# Patient Record
Sex: FEMALE | Race: BLACK OR AFRICAN AMERICAN | NOT HISPANIC OR LATINO | Employment: FULL TIME | URBAN - METROPOLITAN AREA
[De-identification: names, ages, dates, MRNs, and addresses within clinical notes are randomized per-mention and may not be internally consistent; named-entity substitution may affect disease eponyms.]

---
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
## 2023-10-02 VITALS
DIASTOLIC BLOOD PRESSURE: 104 MMHG | OXYGEN SATURATION: 99 % | RESPIRATION RATE: 24 BRPM | HEART RATE: 92 BPM | TEMPERATURE: 97.6 F | SYSTOLIC BLOOD PRESSURE: 124 MMHG

## 2023-10-02 PROCEDURE — 99285 EMERGENCY DEPT VISIT HI MDM: CPT | Mod: 25

## 2023-10-03 ENCOUNTER — APPOINTMENT (OUTPATIENT)
Dept: CT IMAGING | Facility: CLINIC | Age: 23
End: 2023-10-03
Attending: EMERGENCY MEDICINE
Payer: COMMERCIAL

## 2023-10-03 ENCOUNTER — HOSPITAL ENCOUNTER (EMERGENCY)
Facility: CLINIC | Age: 23
Discharge: HOME | End: 2023-10-03
Attending: EMERGENCY MEDICINE | Admitting: EMERGENCY MEDICINE
Payer: COMMERCIAL

## 2023-10-03 DIAGNOSIS — N10 PYELONEPHRITIS, ACUTE: ICD-10-CM

## 2023-10-03 LAB
ALBUMIN UR-MCNC: 30 MG/DL
ANION GAP SERPL CALCULATED.3IONS-SCNC: 11 MMOL/L (ref 7–15)
APPEARANCE UR: ABNORMAL
BACTERIA #/AREA URNS HPF: ABNORMAL /HPF
BASO+EOS+MONOS # BLD AUTO: NORMAL 10*3/UL
BASO+EOS+MONOS NFR BLD AUTO: NORMAL %
BASOPHILS # BLD AUTO: 0 10E3/UL (ref 0–0.2)
BASOPHILS NFR BLD AUTO: 0 %
BILIRUB UR QL STRIP: NEGATIVE
BUN SERPL-MCNC: 8 MG/DL (ref 6–20)
CALCIUM SERPL-MCNC: 9.2 MG/DL (ref 8.6–10)
CHLORIDE SERPL-SCNC: 100 MMOL/L (ref 98–107)
COLOR UR AUTO: YELLOW
CREAT SERPL-MCNC: 0.62 MG/DL (ref 0.51–0.95)
DEPRECATED HCO3 PLAS-SCNC: 24 MMOL/L (ref 22–29)
EGFRCR SERPLBLD CKD-EPI 2021: >90 ML/MIN/1.73M2
EOSINOPHIL # BLD AUTO: 0.1 10E3/UL (ref 0–0.7)
EOSINOPHIL NFR BLD AUTO: 1 %
ERYTHROCYTE [DISTWIDTH] IN BLOOD BY AUTOMATED COUNT: 13.7 % (ref 10–15)
GLUCOSE SERPL-MCNC: 102 MG/DL (ref 70–99)
GLUCOSE UR STRIP-MCNC: NEGATIVE MG/DL
HCG UR QL: NEGATIVE
HCT VFR BLD AUTO: 41.8 % (ref 35–47)
HGB BLD-MCNC: 14.1 G/DL (ref 11.7–15.7)
HGB UR QL STRIP: ABNORMAL
HOLD SPECIMEN: NORMAL
HOLD SPECIMEN: NORMAL
IMM GRANULOCYTES # BLD: 0 10E3/UL
IMM GRANULOCYTES NFR BLD: 0 %
KETONES UR STRIP-MCNC: NEGATIVE MG/DL
LEUKOCYTE ESTERASE UR QL STRIP: ABNORMAL
LYMPHOCYTES # BLD AUTO: 1.8 10E3/UL (ref 0.8–5.3)
LYMPHOCYTES NFR BLD AUTO: 24 %
MCH RBC QN AUTO: 31.1 PG (ref 26.5–33)
MCHC RBC AUTO-ENTMCNC: 33.7 G/DL (ref 31.5–36.5)
MCV RBC AUTO: 92 FL (ref 78–100)
MONOCYTES # BLD AUTO: 0.6 10E3/UL (ref 0–1.3)
MONOCYTES NFR BLD AUTO: 8 %
MUCOUS THREADS #/AREA URNS LPF: PRESENT /LPF
NEUTROPHILS # BLD AUTO: 4.9 10E3/UL (ref 1.6–8.3)
NEUTROPHILS NFR BLD AUTO: 67 %
NITRATE UR QL: NEGATIVE
NRBC # BLD AUTO: 0 10E3/UL
NRBC BLD AUTO-RTO: 0 /100
PH UR STRIP: 6 [PH] (ref 5–7)
PLATELET # BLD AUTO: 312 10E3/UL (ref 150–450)
POTASSIUM SERPL-SCNC: 4.4 MMOL/L (ref 3.4–5.3)
RBC # BLD AUTO: 4.53 10E6/UL (ref 3.8–5.2)
RBC URINE: 12 /HPF
SODIUM SERPL-SCNC: 135 MMOL/L (ref 135–145)
SP GR UR STRIP: 1.01 (ref 1–1.03)
SQUAMOUS EPITHELIAL: 1 /HPF
UROBILINOGEN UR STRIP-MCNC: 2 MG/DL
WBC # BLD AUTO: 7.4 10E3/UL (ref 4–11)
WBC CLUMPS #/AREA URNS HPF: PRESENT /HPF
WBC URINE: >182 /HPF

## 2023-10-03 PROCEDURE — 87186 SC STD MICRODIL/AGAR DIL: CPT | Performed by: EMERGENCY MEDICINE

## 2023-10-03 PROCEDURE — 81025 URINE PREGNANCY TEST: CPT | Performed by: EMERGENCY MEDICINE

## 2023-10-03 PROCEDURE — 258N000003 HC RX IP 258 OP 636: Performed by: EMERGENCY MEDICINE

## 2023-10-03 PROCEDURE — 81001 URINALYSIS AUTO W/SCOPE: CPT | Performed by: EMERGENCY MEDICINE

## 2023-10-03 PROCEDURE — 85025 COMPLETE CBC W/AUTO DIFF WBC: CPT | Performed by: EMERGENCY MEDICINE

## 2023-10-03 PROCEDURE — 250N000011 HC RX IP 250 OP 636: Performed by: EMERGENCY MEDICINE

## 2023-10-03 PROCEDURE — 80048 BASIC METABOLIC PNL TOTAL CA: CPT | Performed by: EMERGENCY MEDICINE

## 2023-10-03 PROCEDURE — 250N000011 HC RX IP 250 OP 636: Mod: JZ | Performed by: EMERGENCY MEDICINE

## 2023-10-03 PROCEDURE — 96365 THER/PROPH/DIAG IV INF INIT: CPT

## 2023-10-03 PROCEDURE — 250N000013 HC RX MED GY IP 250 OP 250 PS 637: Performed by: EMERGENCY MEDICINE

## 2023-10-03 PROCEDURE — 36415 COLL VENOUS BLD VENIPUNCTURE: CPT | Performed by: EMERGENCY MEDICINE

## 2023-10-03 PROCEDURE — 96375 TX/PRO/DX INJ NEW DRUG ADDON: CPT

## 2023-10-03 PROCEDURE — 74176 CT ABD & PELVIS W/O CONTRAST: CPT

## 2023-10-03 RX ORDER — CEFDINIR 300 MG/1
300 CAPSULE ORAL 2 TIMES DAILY
Qty: 14 CAPSULE | Refills: 0 | Status: SHIPPED | OUTPATIENT
Start: 2023-10-03 | End: 2023-10-10

## 2023-10-03 RX ORDER — KETOROLAC TROMETHAMINE 15 MG/ML
15 INJECTION, SOLUTION INTRAMUSCULAR; INTRAVENOUS ONCE
Status: COMPLETED | OUTPATIENT
Start: 2023-10-03 | End: 2023-10-03

## 2023-10-03 RX ORDER — ONDANSETRON 4 MG/1
4 TABLET, ORALLY DISINTEGRATING ORAL ONCE
Status: COMPLETED | OUTPATIENT
Start: 2023-10-03 | End: 2023-10-03

## 2023-10-03 RX ORDER — ACETAMINOPHEN 325 MG/1
975 TABLET ORAL ONCE
Status: COMPLETED | OUTPATIENT
Start: 2023-10-03 | End: 2023-10-03

## 2023-10-03 RX ORDER — CEFTRIAXONE 2 G/1
2 INJECTION, POWDER, FOR SOLUTION INTRAMUSCULAR; INTRAVENOUS ONCE
Status: COMPLETED | OUTPATIENT
Start: 2023-10-03 | End: 2023-10-03

## 2023-10-03 RX ORDER — OXYCODONE HYDROCHLORIDE 5 MG/1
5 TABLET ORAL EVERY 6 HOURS PRN
Qty: 6 TABLET | Refills: 0 | Status: SHIPPED | OUTPATIENT
Start: 2023-10-03 | End: 2023-10-06

## 2023-10-03 RX ORDER — ONDANSETRON 4 MG/1
4 TABLET, ORALLY DISINTEGRATING ORAL EVERY 8 HOURS PRN
Qty: 10 TABLET | Refills: 0 | Status: SHIPPED | OUTPATIENT
Start: 2023-10-03 | End: 2023-10-06

## 2023-10-03 RX ADMIN — KETOROLAC TROMETHAMINE 15 MG: 15 INJECTION INTRAMUSCULAR; INTRAVENOUS at 03:28

## 2023-10-03 RX ADMIN — SODIUM CHLORIDE 1000 ML: 9 INJECTION, SOLUTION INTRAVENOUS at 03:28

## 2023-10-03 RX ADMIN — ONDANSETRON 4 MG: 4 TABLET, ORALLY DISINTEGRATING ORAL at 00:04

## 2023-10-03 RX ADMIN — CEFTRIAXONE 2 G: 2 INJECTION, POWDER, FOR SOLUTION INTRAMUSCULAR; INTRAVENOUS at 03:28

## 2023-10-03 RX ADMIN — ACETAMINOPHEN 975 MG: 325 TABLET, FILM COATED ORAL at 00:04

## 2023-10-03 ASSESSMENT — ACTIVITIES OF DAILY LIVING (ADL)
ADLS_ACUITY_SCORE: 33
ADLS_ACUITY_SCORE: 33

## 2023-10-03 NOTE — ED TRIAGE NOTES
Pt arrives with right sided flank pain since 1500. Pt thinks she has a UTI due to blood in her urine and painful urination. VSS.      Triage Assessment       Row Name 10/02/23 3580       Triage Assessment (Adult)    Airway WDL WDL       Respiratory WDL    Respiratory WDL WDL       Skin Circulation/Temperature WDL    Skin Circulation/Temperature WDL WDL       Cardiac WDL    Cardiac WDL WDL       Peripheral/Neurovascular WDL    Peripheral Neurovascular WDL WDL       Cognitive/Neuro/Behavioral WDL    Cognitive/Neuro/Behavioral WDL WDL

## 2023-10-03 NOTE — ED PROVIDER NOTES
[facility] ED Provider Note  [facility] Emergency Department  7:22 AM  10/3/2023    Colleen Espino  23 year oldfemale    Chief Complaint   Patient presents with    Flank Pain       HPI:    23-year-old female otherwise healthy presenting with right-sided flank pain and lower abdominal pain associated with hematuria and dysuria.  No fevers.  No associated nausea or vomiting.          Independent Historian:     None     Review of External Notes:     None           ROS: ROS is negative other than mentioned above in HPI      Pertinent PMH/PSH:     None         Current Outpatient Medications   Medication Instructions    albuterol (VENTOLIN HFA) 108 (90 BASE) MCG/ACT Inhaler 2 puffs, Inhalation, EVERY 4 HOURS PRN    cefdinir (OMNICEF) 300 mg, Oral, 2 TIMES DAILY    ondansetron (ZOFRAN ODT) 4 mg, Oral, EVERY 8 HOURS PRN    oxyCODONE (ROXICODONE) 5 mg, Oral, EVERY 6 HOURS PRN           No Known Allergies      Physical Exam  BP (!) 124/104   Pulse 92   Temp 97.6  F (36.4  C) (Temporal)   Resp 24   LMP 09/18/2023 (Exact Date)   SpO2 99%       No significant localizing abdominal tenderness palpation    CV: ppi, regular   Resp: speaking in full sentences without any resp distress  Skin: warm dry well perfused  Neuro: Alert, no gross motor or sensory deficits,  gait stable        Labs and Imaging:    Labs Ordered and Resulted from Time of ED Arrival to Time of ED Departure   ROUTINE UA WITH MICROSCOPIC REFLEX TO CULTURE - Abnormal       Result Value    Color Urine Yellow      Appearance Urine Slightly Cloudy (*)     Glucose Urine Negative      Bilirubin Urine Negative      Ketones Urine Negative      Specific Gravity Urine 1.010      Blood Urine Moderate (*)     pH Urine 6.0      Protein Albumin Urine 30 (*)     Urobilinogen Urine 2.0      Nitrite Urine Negative      Leukocyte Esterase Urine Large (*)     Bacteria Urine Few (*)     WBC Clumps Urine Present (*)     Mucus Urine Present (*)     RBC Urine 12 (*)     WBC Urine >182  (*)     Squamous Epithelials Urine 1     BASIC METABOLIC PANEL - Abnormal    Sodium 135      Potassium 4.4      Chloride 100      Carbon Dioxide (CO2) 24      Anion Gap 11      Urea Nitrogen 8.0      Creatinine 0.62      GFR Estimate >90      Calcium 9.2      Glucose 102 (*)    HCG QUALITATIVE URINE - Normal    hCG Urine Qualitative Negative     CBC WITH PLATELETS AND DIFFERENTIAL    WBC Count 7.4      RBC Count 4.53      Hemoglobin 14.1      Hematocrit 41.8      MCV 92      MCH 31.1      MCHC 33.7      RDW 13.7      Platelet Count 312      % Neutrophils 67      % Lymphocytes 24      % Monocytes 8      Mids % (Monos, Eos, Basos)        % Eosinophils 1      % Basophils 0      % Immature Granulocytes 0      NRBCs per 100 WBC 0      Absolute Neutrophils 4.9      Absolute Lymphocytes 1.8      Absolute Monocytes 0.6      Mids Abs (Monos, Eos, Basos)        Absolute Eosinophils 0.1      Absolute Basophils 0.0      Absolute Immature Granulocytes 0.0      Absolute NRBCs 0.0     URINE CULTURE         Abd/pelvis CT no contrast - Stone Protocol   Final Result   IMPRESSION:       1.  Mild wall thickening of the urinary bladder may indicate cystitis. Associated right pyelonephritis is difficult to assess on basis of this noncontrast enhanced study.                 Medications Administered in ED:  Medications   ondansetron (ZOFRAN ODT) ODT tab 4 mg (4 mg Oral $Given 10/3/23 0004)   acetaminophen (TYLENOL) tablet 975 mg (975 mg Oral $Given 10/3/23 0004)   cefTRIAXone (ROCEPHIN) 2 g vial to attach to  ml bag for ADULTS or NS 50 ml bag for PEDS (0 g Intravenous Stopped 10/3/23 0501)   ketorolac (TORADOL) injection 15 mg (15 mg Intravenous $Given 10/3/23 0328)   sodium chloride 0.9% BOLUS 1,000 mL (0 mLs Intravenous Stopped 10/3/23 0501)             Independent Interpretation (X-rays, CTs, rhythm strip):    None      Consultations/Discussion of Management or Tests:    None     Social Determinants of Health affecting  care:    None           Medical Decision Makin-year-old female here with dysuria flank pain found to have cystitis/pyelonephritis but no concomitant obstruction on CT.  Symptoms improved with time and medicines here in the emergency department.  Started on ceftriaxone and transition to oral Omnicef.      Diagnosis:    ICD-10-CM    1. Pyelonephritis, acute  N10           Disposition:  home      Trip Lopez MD  [facility]  [facility]       Trip Lopez MD  10/03/23 5683

## 2023-10-04 LAB — BACTERIA UR CULT: ABNORMAL

## 2023-12-12 ENCOUNTER — HOSPITAL ENCOUNTER (EMERGENCY)
Facility: CLINIC | Age: 23
Discharge: HOME | End: 2023-12-12
Attending: EMERGENCY MEDICINE | Admitting: EMERGENCY MEDICINE
Payer: COMMERCIAL

## 2023-12-12 VITALS
DIASTOLIC BLOOD PRESSURE: 57 MMHG | OXYGEN SATURATION: 100 % | RESPIRATION RATE: 18 BRPM | WEIGHT: 130 LBS | TEMPERATURE: 97.5 F | SYSTOLIC BLOOD PRESSURE: 118 MMHG | HEART RATE: 88 BPM

## 2023-12-12 DIAGNOSIS — N30.00 ACUTE CYSTITIS WITHOUT HEMATURIA: ICD-10-CM

## 2023-12-12 LAB
ALBUMIN UR-MCNC: 70 MG/DL
APPEARANCE UR: ABNORMAL
BACTERIA #/AREA URNS HPF: ABNORMAL /HPF
BILIRUB UR QL STRIP: NEGATIVE
COLOR UR AUTO: ABNORMAL
GLUCOSE UR STRIP-MCNC: NEGATIVE MG/DL
HCG UR QL: NEGATIVE
HGB UR QL STRIP: ABNORMAL
KETONES UR STRIP-MCNC: 10 MG/DL
LEUKOCYTE ESTERASE UR QL STRIP: ABNORMAL
MUCOUS THREADS #/AREA URNS LPF: PRESENT /LPF
NITRATE UR QL: NEGATIVE
PH UR STRIP: 6 [PH] (ref 5–7)
RBC URINE: 7 /HPF
SP GR UR STRIP: 1 (ref 1–1.03)
SQUAMOUS EPITHELIAL: 1 /HPF
UROBILINOGEN UR STRIP-MCNC: NORMAL MG/DL
WBC CLUMPS #/AREA URNS HPF: PRESENT /HPF
WBC URINE: 163 /HPF

## 2023-12-12 PROCEDURE — 250N000013 HC RX MED GY IP 250 OP 250 PS 637: Performed by: EMERGENCY MEDICINE

## 2023-12-12 PROCEDURE — 81001 URINALYSIS AUTO W/SCOPE: CPT | Performed by: EMERGENCY MEDICINE

## 2023-12-12 PROCEDURE — 99284 EMERGENCY DEPT VISIT MOD MDM: CPT

## 2023-12-12 PROCEDURE — 81025 URINE PREGNANCY TEST: CPT | Performed by: EMERGENCY MEDICINE

## 2023-12-12 PROCEDURE — 87086 URINE CULTURE/COLONY COUNT: CPT | Performed by: EMERGENCY MEDICINE

## 2023-12-12 RX ORDER — PHENAZOPYRIDINE HYDROCHLORIDE 200 MG/1
200 TABLET, FILM COATED ORAL 3 TIMES DAILY PRN
Qty: 9 TABLET | Refills: 0 | Status: SHIPPED | OUTPATIENT
Start: 2023-12-12 | End: 2023-12-15

## 2023-12-12 RX ORDER — CEPHALEXIN 500 MG/1
500 CAPSULE ORAL ONCE
Status: COMPLETED | OUTPATIENT
Start: 2023-12-12 | End: 2023-12-12

## 2023-12-12 RX ORDER — CEPHALEXIN 500 MG/1
500 CAPSULE ORAL 3 TIMES DAILY
Qty: 21 CAPSULE | Refills: 0 | Status: SHIPPED | OUTPATIENT
Start: 2023-12-12 | End: 2023-12-19

## 2023-12-12 RX ORDER — PHENAZOPYRIDINE HYDROCHLORIDE 100 MG/1
200 TABLET, FILM COATED ORAL ONCE
Status: COMPLETED | OUTPATIENT
Start: 2023-12-12 | End: 2023-12-12

## 2023-12-12 RX ADMIN — CEPHALEXIN 500 MG: 500 CAPSULE ORAL at 21:40

## 2023-12-12 RX ADMIN — PHENAZOPYRIDINE 200 MG: 100 TABLET ORAL at 21:39

## 2023-12-13 NOTE — ED TRIAGE NOTES
PT reports that she started having urinary frequency over the last 24 hours. PT reports that she had Pyelonephritis in October and reports that this feels similar. PT also reports dysuria. PT VSS and ABC's intact

## 2023-12-13 NOTE — ED PROVIDER NOTES
History     Chief Complaint:  Urinary Frequency     The history is provided by the patient.      Colleen Espino is a 23 year old female with a history of pyelonephritis who presents to the emergency department for urinary frequency. The patient states that yesterday with symptoms worsening today, she has been experiencing dysuria and urinary frequency. She notes that she is also experiencing pain in her lower abdominal pain and lower back. Denies fever or vomiting. Denies any vaginal bleeding or vaginal discharge. Denies any changes to her bowel movements. Denies any past pertinent medical history. Denies taking daily medications. She reports that she was seen in October for acute pyelonephritis.    Independent Historian:   None - Patient Only    Review of External Notes:   None     Medications:    Albuterol    Past Medical History:    HIV positive  Pyelonephritis    Physical Exam   Patient Vitals for the past 24 hrs:   BP Temp Temp src Pulse Resp SpO2 Weight   12/12/23 1833 121/73 97.5  F (36.4  C) Temporal 105 18 99 % 59 kg (130 lb)      Physical Exam      HEENT:    Oropharynx is moist  Eyes:    Conjunctiva normal  Neck:     Supple, no meningismus.     CV:     Regular rate and rhythm.      No murmurs, rubs or gallops.  PULM:    Clear to auscultation bilateral.       No respiratory distress.      Good air exchange.  ABD:    Soft, non-distended.       Mild focal tenderness in the midline low abdomen.     No pulsatile masses.       No rebound, guarding or rigidity.     No CVA tenderness.   MSK:     No gross deformity to all four extremities.   LYMPH:   No cervical lymphadenopathy.  NEURO:   Alert.  Good muscular tone, no atrophy.   Skin:    Warm, dry and intact.    Psych:    Mood is good and affect is appropriate.      Emergency Department Course     Laboratory:  Labs Ordered and Resulted from Time of ED Arrival to Time of ED Departure   ROUTINE UA WITH MICROSCOPIC REFLEX TO CULTURE - Abnormal       Result Value     Color Urine Light Yellow      Appearance Urine Slightly Cloudy (*)     Glucose Urine Negative      Bilirubin Urine Negative      Ketones Urine 10 (*)     Specific Gravity Urine 1.005      Blood Urine Moderate (*)     pH Urine 6.0      Protein Albumin Urine 70 (*)     Urobilinogen Urine Normal      Nitrite Urine Negative      Leukocyte Esterase Urine Large (*)     Bacteria Urine Many (*)     WBC Clumps Urine Present (*)     Mucus Urine Present (*)     RBC Urine 7 (*)     WBC Urine 163 (*)     Squamous Epithelials Urine 1     HCG QUALITATIVE URINE - Normal    hCG Urine Qualitative Negative     URINE CULTURE      Emergency Department Course & Assessments:    Interventions:  Medications   cephALEXin (KEFLEX) capsule 500 mg (has no administration in time range)   phenazopyridine (PYRIDIUM) tablet 200 mg (has no administration in time range)      Assessments:   I obtained history and examined the patient as noted above. I discussed findings and   discharge with the patient. All questions answered.     Independent Interpretation (X-rays, CTs, rhythm strip):  None    Consultations/Discussion of Management or Tests:  None     Social Determinants of Health affecting care:   None    Disposition:  The patient was discharged to home.     Impression & Plan      Medical Decision Makin-year-old female presents with urinary frequency and urgency.  Evaluation is consistent with acute cystitis.  She has no features to suggest pyelonephritis.  Previous urine culture revealed E. coli that was pansensitive.  Patient given first dose of cephalexin in the ED and will be discharged home with cephalexin 3 times daily for 7 days.  Pyridium as needed for pain.  Return to ED for worsening symptoms.    Diagnosis:    ICD-10-CM    1. Acute cystitis without hematuria  N30.00          Discharge Medications:  New Prescriptions    CEPHALEXIN (KEFLEX) 500 MG CAPSULE    Take 1 capsule (500 mg) by mouth 3 times daily for 7 days     PHENAZOPYRIDINE (PYRIDIUM) 200 MG TABLET    Take 1 tablet (200 mg) by mouth 3 times daily as needed for pain (painful urination)      Scribe Disclosure:  I, David Evans, am serving as a scribe at 9:33 PM on 12/12/2023 to document services personally performed by Edi Siegel MD based on my observations and the provider's statements to me.     12/12/2023   Edi Siegel MD Matthews, Jeremiah R, MD  12/13/23 0123

## 2023-12-14 ENCOUNTER — TELEPHONE (OUTPATIENT)
Dept: EMERGENCY MEDICINE | Facility: CLINIC | Age: 23
End: 2023-12-14
Payer: COMMERCIAL

## 2023-12-14 LAB — BACTERIA UR CULT: ABNORMAL

## 2023-12-14 NOTE — TELEPHONE ENCOUNTER
[facility] Emergency Department/Urgent Care Lab result notification  [Note:  ED Lab Results RN will reference the [facility] Emergency Dept visit note prior to contacting patient AND/OR prior to consulting Emergency Dept Provider.  Highlights of Emergency Dept visit in information summary at the bottom of this telephone note]    1. Reason for call  Notify of lab results  Assess patient symptoms [if necessary]  Review ED Providers recommendations/discharge instructions (if necessary)  Advise per [facility] ED lab result protocol    2. Lab Result (including Rx patient on, if applicable).  If culture, copy of lab report at bottom.  Final Urine Culture Report on 12/14/23  [facility] Emergency Dept discharge antibiotic prescribed: Cephalexin (Keflex) 500 mg capsule, 1 capsule (500 mg) by mouth 3 times daily for 7 days.  #1. Bacteria, 50,000 - 100,000 CFU/ML Escherichia coli ESBL,  is [RESISTANT] to antibiotic.  Recommendations in treatment per [facility] lab result Urine Culture protocol.    3. RN Assessment (Patient's current Symptoms):  Time of call: 1520  Assessment: Left voicemail message requesting a call back to [facility] ED Lab Result RN at [phone].  RN is available every day between 9 a.m. and 5:30 p.m.  Letter Sent    4. RN Recommendations/Instructions per [facility] lab result protocol  [facility] ED lab result protocol used: Urine Cx  Colleen was not notified of lab result and treatment recommendations  Urine Cx shows resistance to ABX, should be switched per protocol on callback    Information summary from Emergency Dept/Urgent Care visit on 12/12/23  Symptoms reported at ED/UC visit (Chief complaint, HPI) Colleen Espino is a 23 year old female with a history of pyelonephritis who presents to the emergency department for urinary frequency. The patient states that yesterday with symptoms worsening today, she has been experiencing dysuria and urinary  frequency. She notes that she is also experiencing pain in her lower abdominal pain and lower back. Denies fever or vomiting. Denies any vaginal bleeding or vaginal discharge. Denies any changes to her bowel movements. Denies any past pertinent medical history. Denies taking daily medications. She reports that she was seen in October for acute pyelonephritis.    Significant Medical hx, if applicable (i.e. CKD, diabetes) Reviewed   Allergies No Known Allergies   Weight, if applicable Wt Readings from Last 2 Encounters:   12/12/23 59 kg (130 lb)      Coumadin/Warfarin [Yes /No] No   Creatinine Level (mg/dl) Creatinine   Date Value Ref Range Status   10/03/2023 0.62 0.51 - 0.95 mg/dL Final      Creatinine clearance (ml/min), if applicable Based off last recorded weight, estimated CrCL of 132.976     Pregnant (Yes/No/NA) Hcg Neg 12/12/23   Breastfeeding (Yes/No/NA) No   ED/UC Provider Impression and Plan (applicable information) 23-year-old female presents with urinary frequency and urgency.  Evaluation is consistent with acute cystitis.  She has no features to suggest pyelonephritis.  Previous urine culture revealed E. coli that was pansensitive.  Patient given first dose of cephalexin in the ED and will be discharged home with cephalexin 3 times daily for 7 days.  Pyridium as needed for pain.  Return to ED for worsening symptoms.    ED/UC diagnosis Acute cystitis without hematuria   ED/UC Provider Edi Siegel MD        Copy of Lab report (if applicable)        Benoit Martinez RN  [facility]  Emergency Dept Lab Result RN  Ph# [phone]

## 2023-12-14 NOTE — LETTER
December 14, 2023        Colleen Espino  [address] [place] [address]          Dear Colleen Espino:    You were seen in the [facility] Emergency Department at [facility] EMERGENCY DEPT on 12/12/2023.  We are unable to reach you by phone, so we are sending you this letter.     It is important that you call [facility] Emergency Department lab result nurse at [phone], as we have information to relay to you AND/OR we MAY have to make some changes in your treatment.    Best time to call back is between 9AM and 5:30PM, 7 days a week.      Sincerely,     [facility] Emergency Department Lab Result RN  [phone]

## 2024-06-24 ENCOUNTER — APPOINTMENT (OUTPATIENT)
Dept: ULTRASOUND IMAGING | Facility: CLINIC | Age: 24
End: 2024-06-24

## 2024-06-24 ENCOUNTER — ANESTHESIA (OUTPATIENT)
Dept: SURGERY | Facility: CLINIC | Age: 24
End: 2024-06-24

## 2024-06-24 ENCOUNTER — HOSPITAL ENCOUNTER (OUTPATIENT)
Facility: CLINIC | Age: 24
Discharge: HOME | End: 2024-06-25
Admitting: STUDENT IN AN ORGANIZED HEALTH CARE EDUCATION/TRAINING PROGRAM

## 2024-06-24 ENCOUNTER — ANESTHESIA EVENT (OUTPATIENT)
Dept: SURGERY | Facility: CLINIC | Age: 24
End: 2024-06-24

## 2024-06-24 DIAGNOSIS — Z75.8 DOES NOT HAVE PRIMARY CARE PROVIDER: ICD-10-CM

## 2024-06-24 DIAGNOSIS — F41.9 ANXIETY: ICD-10-CM

## 2024-06-24 DIAGNOSIS — O02.1 MISSED ABORTION WITH FETAL DEMISE BEFORE 20 COMPLETED WEEKS OF GESTATION: ICD-10-CM

## 2024-06-24 DIAGNOSIS — Z21 HIV INFECTION, UNSPECIFIED SYMPTOM STATUS (H): Primary | ICD-10-CM

## 2024-06-24 LAB
ABO/RH(D): NORMAL
ANION GAP SERPL CALCULATED.3IONS-SCNC: 14 MMOL/L (ref 7–15)
ANTIBODY SCREEN: NEGATIVE
APTT PPP: 26 SECONDS (ref 22–38)
BASOPHILS # BLD AUTO: 0 10E3/UL (ref 0–0.2)
BASOPHILS NFR BLD AUTO: 0 %
BUN SERPL-MCNC: 4.8 MG/DL (ref 6–20)
CALCIUM SERPL-MCNC: 9.1 MG/DL (ref 8.6–10)
CHLORIDE SERPL-SCNC: 102 MMOL/L (ref 98–107)
CREAT SERPL-MCNC: 0.51 MG/DL (ref 0.51–0.95)
DEPRECATED HCO3 PLAS-SCNC: 21 MMOL/L (ref 22–29)
EGFRCR SERPLBLD CKD-EPI 2021: >90 ML/MIN/1.73M2
EOSINOPHIL # BLD AUTO: 0.2 10E3/UL (ref 0–0.7)
EOSINOPHIL NFR BLD AUTO: 2 %
ERYTHROCYTE [DISTWIDTH] IN BLOOD BY AUTOMATED COUNT: 14 % (ref 10–15)
FIBRINOGEN PPP-MCNC: 394 MG/DL (ref 170–490)
FIBRINOGEN PPP-MCNC: NORMAL MG/DL
GLUCOSE SERPL-MCNC: 96 MG/DL (ref 70–99)
HCG INTACT+B SERPL-ACNC: 3700 MIU/ML
HCT VFR BLD AUTO: 37.8 % (ref 35–47)
HGB BLD-MCNC: 13.1 G/DL (ref 11.7–15.7)
IMM GRANULOCYTES # BLD: 0 10E3/UL
IMM GRANULOCYTES NFR BLD: 0 %
INR PPP: 1.13 (ref 0.85–1.15)
INR PPP: NORMAL
LYMPHOCYTES # BLD AUTO: 1.1 10E3/UL (ref 0.8–5.3)
LYMPHOCYTES NFR BLD AUTO: 12 %
MCH RBC QN AUTO: 34.8 PG (ref 26.5–33)
MCHC RBC AUTO-ENTMCNC: 34.7 G/DL (ref 31.5–36.5)
MCV RBC AUTO: 101 FL (ref 78–100)
MONOCYTES # BLD AUTO: 0.7 10E3/UL (ref 0–1.3)
MONOCYTES NFR BLD AUTO: 8 %
NEUTROPHILS # BLD AUTO: 6.9 10E3/UL (ref 1.6–8.3)
NEUTROPHILS NFR BLD AUTO: 78 %
NRBC # BLD AUTO: 0 10E3/UL
NRBC BLD AUTO-RTO: 0 /100
PLATELET # BLD AUTO: 333 10E3/UL (ref 150–450)
POTASSIUM SERPL-SCNC: 4.7 MMOL/L (ref 3.4–5.3)
RBC # BLD AUTO: 3.76 10E6/UL (ref 3.8–5.2)
SODIUM SERPL-SCNC: 137 MMOL/L (ref 135–145)
SPECIMEN EXPIRATION DATE: NORMAL
WBC # BLD AUTO: 8.9 10E3/UL (ref 4–11)

## 2024-06-24 PROCEDURE — 99244 OFF/OP CNSLTJ NEW/EST MOD 40: CPT | Mod: 57 | Performed by: STUDENT IN AN ORGANIZED HEALTH CARE EDUCATION/TRAINING PROGRAM

## 2024-06-24 PROCEDURE — 250N000011 HC RX IP 250 OP 636: Mod: JZ | Performed by: NURSE ANESTHETIST, CERTIFIED REGISTERED

## 2024-06-24 PROCEDURE — 250N000009 HC RX 250: Performed by: STUDENT IN AN ORGANIZED HEALTH CARE EDUCATION/TRAINING PROGRAM

## 2024-06-24 PROCEDURE — 360N000076 HC SURGERY LEVEL 3, PER MIN: Performed by: STUDENT IN AN ORGANIZED HEALTH CARE EDUCATION/TRAINING PROGRAM

## 2024-06-24 PROCEDURE — 59812 TREATMENT OF MISCARRIAGE: CPT | Performed by: STUDENT IN AN ORGANIZED HEALTH CARE EDUCATION/TRAINING PROGRAM

## 2024-06-24 PROCEDURE — 76801 OB US < 14 WKS SINGLE FETUS: CPT

## 2024-06-24 PROCEDURE — 710N000009 HC RECOVERY PHASE 1, LEVEL 1, PER MIN: Performed by: STUDENT IN AN ORGANIZED HEALTH CARE EDUCATION/TRAINING PROGRAM

## 2024-06-24 PROCEDURE — 999N000063 US INTRAOPERATIVE: Mod: TC

## 2024-06-24 PROCEDURE — 258N000003 HC RX IP 258 OP 636: Mod: JZ | Performed by: NURSE ANESTHETIST, CERTIFIED REGISTERED

## 2024-06-24 PROCEDURE — 96361 HYDRATE IV INFUSION ADD-ON: CPT

## 2024-06-24 PROCEDURE — 85384 FIBRINOGEN ACTIVITY: CPT | Performed by: EMERGENCY MEDICINE

## 2024-06-24 PROCEDURE — 85384 FIBRINOGEN ACTIVITY: CPT

## 2024-06-24 PROCEDURE — 85610 PROTHROMBIN TIME: CPT | Performed by: EMERGENCY MEDICINE

## 2024-06-24 PROCEDURE — 87491 CHLMYD TRACH DNA AMP PROBE: CPT | Performed by: STUDENT IN AN ORGANIZED HEALTH CARE EDUCATION/TRAINING PROGRAM

## 2024-06-24 PROCEDURE — 96374 THER/PROPH/DIAG INJ IV PUSH: CPT

## 2024-06-24 PROCEDURE — 250N000025 HC SEVOFLURANE, PER MIN: Performed by: STUDENT IN AN ORGANIZED HEALTH CARE EDUCATION/TRAINING PROGRAM

## 2024-06-24 PROCEDURE — 250N000009 HC RX 250: Performed by: NURSE ANESTHETIST, CERTIFIED REGISTERED

## 2024-06-24 PROCEDURE — 278N000051 HC OR IMPLANT GENERAL: Performed by: STUDENT IN AN ORGANIZED HEALTH CARE EDUCATION/TRAINING PROGRAM

## 2024-06-24 PROCEDURE — 80048 BASIC METABOLIC PNL TOTAL CA: CPT

## 2024-06-24 PROCEDURE — 36415 COLL VENOUS BLD VENIPUNCTURE: CPT

## 2024-06-24 PROCEDURE — 272N000001 HC OR GENERAL SUPPLY STERILE: Performed by: STUDENT IN AN ORGANIZED HEALTH CARE EDUCATION/TRAINING PROGRAM

## 2024-06-24 PROCEDURE — 85025 COMPLETE CBC W/AUTO DIFF WBC: CPT

## 2024-06-24 PROCEDURE — 710N000012 HC RECOVERY PHASE 2, PER MINUTE: Performed by: STUDENT IN AN ORGANIZED HEALTH CARE EDUCATION/TRAINING PROGRAM

## 2024-06-24 PROCEDURE — 88305 TISSUE EXAM BY PATHOLOGIST: CPT | Mod: 26 | Performed by: PATHOLOGY

## 2024-06-24 PROCEDURE — 250N000011 HC RX IP 250 OP 636: Mod: JZ | Performed by: STUDENT IN AN ORGANIZED HEALTH CARE EDUCATION/TRAINING PROGRAM

## 2024-06-24 PROCEDURE — 250N000011 HC RX IP 250 OP 636: Mod: JZ

## 2024-06-24 PROCEDURE — 88233 TISSUE CULTURE SKIN/BIOPSY: CPT | Performed by: STUDENT IN AN ORGANIZED HEALTH CARE EDUCATION/TRAINING PROGRAM

## 2024-06-24 PROCEDURE — 999N000141 HC STATISTIC PRE-PROCEDURE NURSING ASSESSMENT: Performed by: STUDENT IN AN ORGANIZED HEALTH CARE EDUCATION/TRAINING PROGRAM

## 2024-06-24 PROCEDURE — G0145 SCR C/V CYTO,THINLAYER,RESCR: HCPCS | Performed by: STUDENT IN AN ORGANIZED HEALTH CARE EDUCATION/TRAINING PROGRAM

## 2024-06-24 PROCEDURE — 250N000013 HC RX MED GY IP 250 OP 250 PS 637: Performed by: STUDENT IN AN ORGANIZED HEALTH CARE EDUCATION/TRAINING PROGRAM

## 2024-06-24 PROCEDURE — 84702 CHORIONIC GONADOTROPIN TEST: CPT

## 2024-06-24 PROCEDURE — 85610 PROTHROMBIN TIME: CPT

## 2024-06-24 PROCEDURE — 370N000017 HC ANESTHESIA TECHNICAL FEE, PER MIN: Performed by: STUDENT IN AN ORGANIZED HEALTH CARE EDUCATION/TRAINING PROGRAM

## 2024-06-24 PROCEDURE — 11981 INSERTION DRUG DLVR IMPLANT: CPT | Mod: 51 | Performed by: STUDENT IN AN ORGANIZED HEALTH CARE EDUCATION/TRAINING PROGRAM

## 2024-06-24 PROCEDURE — 86900 BLOOD TYPING SEROLOGIC ABO: CPT

## 2024-06-24 PROCEDURE — 99285 EMERGENCY DEPT VISIT HI MDM: CPT | Mod: 25

## 2024-06-24 PROCEDURE — 258N000003 HC RX IP 258 OP 636: Mod: JZ

## 2024-06-24 PROCEDURE — 85730 THROMBOPLASTIN TIME PARTIAL: CPT

## 2024-06-24 PROCEDURE — 88305 TISSUE EXAM BY PATHOLOGIST: CPT | Mod: TC | Performed by: STUDENT IN AN ORGANIZED HEALTH CARE EDUCATION/TRAINING PROGRAM

## 2024-06-24 PROCEDURE — 250N000013 HC RX MED GY IP 250 OP 250 PS 637

## 2024-06-24 RX ORDER — FENTANYL CITRATE 50 UG/ML
25 INJECTION, SOLUTION INTRAMUSCULAR; INTRAVENOUS EVERY 5 MIN PRN
Status: DISCONTINUED | OUTPATIENT
Start: 2024-06-24 | End: 2024-06-24 | Stop reason: HOSPADM

## 2024-06-24 RX ORDER — HYDROMORPHONE HYDROCHLORIDE 1 MG/ML
1 INJECTION, SOLUTION INTRAMUSCULAR; INTRAVENOUS; SUBCUTANEOUS ONCE
Status: COMPLETED | OUTPATIENT
Start: 2024-06-24 | End: 2024-06-24

## 2024-06-24 RX ORDER — DEXAMETHASONE SODIUM PHOSPHATE 4 MG/ML
4 INJECTION, SOLUTION INTRA-ARTICULAR; INTRALESIONAL; INTRAMUSCULAR; INTRAVENOUS; SOFT TISSUE
Status: DISCONTINUED | OUTPATIENT
Start: 2024-06-24 | End: 2024-06-24 | Stop reason: HOSPADM

## 2024-06-24 RX ORDER — HYDROXYZINE PAMOATE 25 MG/1
25 CAPSULE ORAL EVERY 8 HOURS PRN
Qty: 5 CAPSULE | Refills: 0 | Status: SHIPPED | OUTPATIENT
Start: 2024-06-24 | End: 2024-06-24

## 2024-06-24 RX ORDER — DOXYCYCLINE 100 MG/1
200 CAPSULE ORAL ONCE
Status: COMPLETED | OUTPATIENT
Start: 2024-06-24 | End: 2024-06-24

## 2024-06-24 RX ORDER — HYDRALAZINE HYDROCHLORIDE 20 MG/ML
2.5-5 INJECTION INTRAMUSCULAR; INTRAVENOUS EVERY 10 MIN PRN
Status: DISCONTINUED | OUTPATIENT
Start: 2024-06-24 | End: 2024-06-24 | Stop reason: HOSPADM

## 2024-06-24 RX ORDER — ONDANSETRON 2 MG/ML
4 INJECTION INTRAMUSCULAR; INTRAVENOUS ONCE
Status: DISCONTINUED | OUTPATIENT
Start: 2024-06-24 | End: 2024-06-24 | Stop reason: HOSPADM

## 2024-06-24 RX ORDER — LIDOCAINE HYDROCHLORIDE 10 MG/ML
INJECTION, SOLUTION EPIDURAL; INFILTRATION; INTRACAUDAL; PERINEURAL PRN
Status: DISCONTINUED | OUTPATIENT
Start: 2024-06-24 | End: 2024-06-25 | Stop reason: HOSPADM

## 2024-06-24 RX ORDER — IBUPROFEN 600 MG/1
600 TABLET, FILM COATED ORAL EVERY 6 HOURS PRN
Qty: 60 TABLET | Refills: 0 | Status: SHIPPED | OUTPATIENT
Start: 2024-06-24

## 2024-06-24 RX ORDER — ACETAMINOPHEN 325 MG/1
650 TABLET ORAL EVERY 6 HOURS PRN
Qty: 100 TABLET | Refills: 0 | Status: SHIPPED | OUTPATIENT
Start: 2024-06-24

## 2024-06-24 RX ORDER — DEXAMETHASONE SODIUM PHOSPHATE 4 MG/ML
INJECTION, SOLUTION INTRA-ARTICULAR; INTRALESIONAL; INTRAMUSCULAR; INTRAVENOUS; SOFT TISSUE PRN
Status: DISCONTINUED | OUTPATIENT
Start: 2024-06-24 | End: 2024-06-24

## 2024-06-24 RX ORDER — DIPHENHYDRAMINE HCL 25 MG
25 CAPSULE ORAL EVERY 6 HOURS PRN
Status: DISCONTINUED | OUTPATIENT
Start: 2024-06-24 | End: 2024-06-24 | Stop reason: HOSPADM

## 2024-06-24 RX ORDER — HYDROMORPHONE HCL IN WATER/PF 6 MG/30 ML
0.4 PATIENT CONTROLLED ANALGESIA SYRINGE INTRAVENOUS EVERY 5 MIN PRN
Status: DISCONTINUED | OUTPATIENT
Start: 2024-06-24 | End: 2024-06-24 | Stop reason: HOSPADM

## 2024-06-24 RX ORDER — ACETAMINOPHEN 325 MG/1
975 TABLET ORAL ONCE
Status: CANCELLED | OUTPATIENT
Start: 2024-06-24 | End: 2024-06-24

## 2024-06-24 RX ORDER — ACETAMINOPHEN 325 MG/1
650 TABLET ORAL EVERY 6 HOURS PRN
Qty: 100 TABLET | Refills: 0 | Status: SHIPPED | OUTPATIENT
Start: 2024-06-24 | End: 2024-06-24

## 2024-06-24 RX ORDER — ONDANSETRON 4 MG/1
4 TABLET, ORALLY DISINTEGRATING ORAL EVERY 30 MIN PRN
Status: DISCONTINUED | OUTPATIENT
Start: 2024-06-24 | End: 2024-06-24 | Stop reason: HOSPADM

## 2024-06-24 RX ORDER — PROPOFOL 10 MG/ML
INJECTION, EMULSION INTRAVENOUS PRN
Status: DISCONTINUED | OUTPATIENT
Start: 2024-06-24 | End: 2024-06-24

## 2024-06-24 RX ORDER — IBUPROFEN 600 MG/1
600 TABLET, FILM COATED ORAL EVERY 6 HOURS PRN
Qty: 60 TABLET | Refills: 0 | Status: SHIPPED | OUTPATIENT
Start: 2024-06-24 | End: 2024-06-24

## 2024-06-24 RX ORDER — DIAZEPAM 10 MG/2ML
2.5 INJECTION, SOLUTION INTRAMUSCULAR; INTRAVENOUS
Status: DISCONTINUED | OUTPATIENT
Start: 2024-06-24 | End: 2024-06-24 | Stop reason: HOSPADM

## 2024-06-24 RX ORDER — LIDOCAINE 40 MG/G
CREAM TOPICAL
Status: DISCONTINUED | OUTPATIENT
Start: 2024-06-24 | End: 2024-06-24 | Stop reason: HOSPADM

## 2024-06-24 RX ORDER — DIPHENHYDRAMINE HYDROCHLORIDE 50 MG/ML
25 INJECTION INTRAMUSCULAR; INTRAVENOUS EVERY 6 HOURS PRN
Status: DISCONTINUED | OUTPATIENT
Start: 2024-06-24 | End: 2024-06-24 | Stop reason: HOSPADM

## 2024-06-24 RX ORDER — IBUPROFEN 800 MG/1
800 TABLET, FILM COATED ORAL ONCE
Status: CANCELLED | OUTPATIENT
Start: 2024-06-25 | End: 2024-06-25

## 2024-06-24 RX ORDER — HYDROXYZINE PAMOATE 25 MG/1
25 CAPSULE ORAL EVERY 8 HOURS PRN
Qty: 5 CAPSULE | Refills: 0 | Status: SHIPPED | OUTPATIENT
Start: 2024-06-24

## 2024-06-24 RX ORDER — FENTANYL CITRATE 50 UG/ML
50 INJECTION, SOLUTION INTRAMUSCULAR; INTRAVENOUS EVERY 5 MIN PRN
Status: DISCONTINUED | OUTPATIENT
Start: 2024-06-24 | End: 2024-06-24 | Stop reason: HOSPADM

## 2024-06-24 RX ORDER — ALBUTEROL SULFATE 0.83 MG/ML
2.5 SOLUTION RESPIRATORY (INHALATION) EVERY 4 HOURS PRN
Status: DISCONTINUED | OUTPATIENT
Start: 2024-06-24 | End: 2024-06-24 | Stop reason: HOSPADM

## 2024-06-24 RX ORDER — GLYCOPYRROLATE 0.2 MG/ML
INJECTION, SOLUTION INTRAMUSCULAR; INTRAVENOUS PRN
Status: DISCONTINUED | OUTPATIENT
Start: 2024-06-24 | End: 2024-06-24

## 2024-06-24 RX ORDER — FENTANYL CITRATE 50 UG/ML
INJECTION, SOLUTION INTRAMUSCULAR; INTRAVENOUS PRN
Status: DISCONTINUED | OUTPATIENT
Start: 2024-06-24 | End: 2024-06-24

## 2024-06-24 RX ORDER — SODIUM CHLORIDE, SODIUM LACTATE, POTASSIUM CHLORIDE, CALCIUM CHLORIDE 600; 310; 30; 20 MG/100ML; MG/100ML; MG/100ML; MG/100ML
INJECTION, SOLUTION INTRAVENOUS CONTINUOUS
Status: DISCONTINUED | OUTPATIENT
Start: 2024-06-24 | End: 2024-06-24 | Stop reason: HOSPADM

## 2024-06-24 RX ORDER — ONDANSETRON 2 MG/ML
4 INJECTION INTRAMUSCULAR; INTRAVENOUS EVERY 30 MIN PRN
Status: DISCONTINUED | OUTPATIENT
Start: 2024-06-24 | End: 2024-06-24 | Stop reason: HOSPADM

## 2024-06-24 RX ORDER — ONDANSETRON 2 MG/ML
INJECTION INTRAMUSCULAR; INTRAVENOUS PRN
Status: DISCONTINUED | OUTPATIENT
Start: 2024-06-24 | End: 2024-06-24

## 2024-06-24 RX ORDER — ONDANSETRON 4 MG/1
4 TABLET, ORALLY DISINTEGRATING ORAL ONCE
Status: DISCONTINUED | OUTPATIENT
Start: 2024-06-24 | End: 2024-06-24 | Stop reason: HOSPADM

## 2024-06-24 RX ORDER — NALOXONE HYDROCHLORIDE 0.4 MG/ML
0.1 INJECTION, SOLUTION INTRAMUSCULAR; INTRAVENOUS; SUBCUTANEOUS
Status: DISCONTINUED | OUTPATIENT
Start: 2024-06-24 | End: 2024-06-24 | Stop reason: HOSPADM

## 2024-06-24 RX ORDER — HYDROMORPHONE HCL IN WATER/PF 6 MG/30 ML
0.2 PATIENT CONTROLLED ANALGESIA SYRINGE INTRAVENOUS EVERY 5 MIN PRN
Status: DISCONTINUED | OUTPATIENT
Start: 2024-06-24 | End: 2024-06-24 | Stop reason: HOSPADM

## 2024-06-24 RX ORDER — ACETAMINOPHEN 325 MG/1
975 TABLET ORAL ONCE
Status: COMPLETED | OUTPATIENT
Start: 2024-06-24 | End: 2024-06-24

## 2024-06-24 RX ORDER — SODIUM CHLORIDE, SODIUM LACTATE, POTASSIUM CHLORIDE, CALCIUM CHLORIDE 600; 310; 30; 20 MG/100ML; MG/100ML; MG/100ML; MG/100ML
INJECTION, SOLUTION INTRAVENOUS CONTINUOUS PRN
Status: DISCONTINUED | OUTPATIENT
Start: 2024-06-24 | End: 2024-06-24

## 2024-06-24 RX ORDER — LABETALOL HYDROCHLORIDE 5 MG/ML
10 INJECTION, SOLUTION INTRAVENOUS
Status: DISCONTINUED | OUTPATIENT
Start: 2024-06-24 | End: 2024-06-24 | Stop reason: HOSPADM

## 2024-06-24 RX ORDER — ACETAMINOPHEN 500 MG
1000 TABLET ORAL ONCE
Status: COMPLETED | OUTPATIENT
Start: 2024-06-24 | End: 2024-06-24

## 2024-06-24 RX ORDER — LIDOCAINE HYDROCHLORIDE 20 MG/ML
INJECTION, SOLUTION INFILTRATION; PERINEURAL PRN
Status: DISCONTINUED | OUTPATIENT
Start: 2024-06-24 | End: 2024-06-24

## 2024-06-24 RX ORDER — ACETAMINOPHEN 325 MG/1
975 TABLET ORAL ONCE
Status: CANCELLED | OUTPATIENT
Start: 2024-06-25 | End: 2024-06-25

## 2024-06-24 RX ORDER — KETOROLAC TROMETHAMINE 30 MG/ML
INJECTION, SOLUTION INTRAMUSCULAR; INTRAVENOUS PRN
Status: DISCONTINUED | OUTPATIENT
Start: 2024-06-24 | End: 2024-06-24

## 2024-06-24 RX ADMIN — PROPOFOL 200 MG: 10 INJECTION, EMULSION INTRAVENOUS at 21:38

## 2024-06-24 RX ADMIN — MIDAZOLAM 2 MG: 1 INJECTION INTRAMUSCULAR; INTRAVENOUS at 21:34

## 2024-06-24 RX ADMIN — ONDANSETRON 4 MG: 2 INJECTION INTRAMUSCULAR; INTRAVENOUS at 21:38

## 2024-06-24 RX ADMIN — SODIUM CHLORIDE 1000 ML: 9 INJECTION, SOLUTION INTRAVENOUS at 15:41

## 2024-06-24 RX ADMIN — LIDOCAINE HYDROCHLORIDE 50 MG: 20 INJECTION, SOLUTION INFILTRATION; PERINEURAL at 21:38

## 2024-06-24 RX ADMIN — HYDROMORPHONE HYDROCHLORIDE 1 MG: 1 INJECTION, SOLUTION INTRAMUSCULAR; INTRAVENOUS; SUBCUTANEOUS at 17:54

## 2024-06-24 RX ADMIN — FENTANYL CITRATE 100 MCG: 50 INJECTION INTRAMUSCULAR; INTRAVENOUS at 21:38

## 2024-06-24 RX ADMIN — DOXYCYCLINE HYCLATE 200 MG: 100 CAPSULE ORAL at 21:33

## 2024-06-24 RX ADMIN — ACETAMINOPHEN 975 MG: 325 TABLET, FILM COATED ORAL at 21:15

## 2024-06-24 RX ADMIN — KETOROLAC TROMETHAMINE 30 MG: 30 INJECTION, SOLUTION INTRAMUSCULAR at 21:38

## 2024-06-24 RX ADMIN — SODIUM CHLORIDE, POTASSIUM CHLORIDE, SODIUM LACTATE AND CALCIUM CHLORIDE: 600; 310; 30; 20 INJECTION, SOLUTION INTRAVENOUS at 21:30

## 2024-06-24 RX ADMIN — GLYCOPYRROLATE 0.2 MG: 0.2 INJECTION, SOLUTION INTRAMUSCULAR; INTRAVENOUS at 21:38

## 2024-06-24 RX ADMIN — ACETAMINOPHEN 1000 MG: 500 TABLET, FILM COATED ORAL at 13:54

## 2024-06-24 RX ADMIN — FENTANYL CITRATE 50 MCG: 50 INJECTION INTRAMUSCULAR; INTRAVENOUS at 21:53

## 2024-06-24 RX ADMIN — DEXAMETHASONE SODIUM PHOSPHATE 8 MG: 4 INJECTION, SOLUTION INTRA-ARTICULAR; INTRALESIONAL; INTRAMUSCULAR; INTRAVENOUS; SOFT TISSUE at 21:38

## 2024-06-24 ASSESSMENT — ACTIVITIES OF DAILY LIVING (ADL)
ADLS_ACUITY_SCORE: 35

## 2024-06-24 ASSESSMENT — COLUMBIA-SUICIDE SEVERITY RATING SCALE - C-SSRS
2. HAVE YOU ACTUALLY HAD ANY THOUGHTS OF KILLING YOURSELF IN THE PAST MONTH?: NO
6. HAVE YOU EVER DONE ANYTHING, STARTED TO DO ANYTHING, OR PREPARED TO DO ANYTHING TO END YOUR LIFE?: NO
1. IN THE PAST MONTH, HAVE YOU WISHED YOU WERE DEAD OR WISHED YOU COULD GO TO SLEEP AND NOT WAKE UP?: NO

## 2024-06-24 NOTE — LETTER
[facility] EMERGENCY DEPT  [address] [place] [address]  Phone: [phone]  Fax: [phone]    June 24, 2024        Colleen Espino  [address] [address]          To whom it may concern:    RE: Colleen NEWMAN Srinivas    Colleen Espino was seen in our hospital on 06/24/24 and had a surgical procedure performed. I expect her to be able to return to work on Friday, 06/28/24.    Please contact me for questions or concerns.      Sincerely,          Nish Hyde MD

## 2024-06-24 NOTE — ED PROVIDER NOTES
"Emergency Department Note      History of Present Illness   Chief Complaint  Abdominal Pain    HPI  Colleen Espino is a  24 year old HIV-positive female approximately 11 weeks pregnant by last menstrual period presenting today for evaluation of abdominal pain bleeding.  Patient reports last night, she started to have onset of vaginal bleeding requiring changing pad 4 times yesterday.  This morning, she had persisting vaginal bleeding and is passing large clots.  She also started to have severe lower abdominal pain radiating into the back.  This was not a planned pregnancy.  She has not had any OB/GYN consultation for evaluation.  Ultrasound has not been performed.  She denies any lightheadedness, dizziness, abnormal vaginal bleeding.  This is her first pregnancy.  She is here with her best friend.    Independent Historian  None    Review of External Notes  Infectious diseases note from 2023    Past Medical History   Medical History and Problem List  HIV positive  TB    Medications  Dolutegravir  [id number]    Surgical History   The patient does not have any pertinent past surgical history.   Physical Exam   Patient Vitals for the past 24 hrs:   BP Temp Temp src Pulse Resp SpO2 Height Weight   24 1538 -- -- -- -- -- 100 % -- --   24 1523 (!) 111/106 -- -- -- -- -- -- --   24 1323 120/83 -- -- -- -- 100 % -- --   24 1309 129/71 97.5  F (36.4  C) Temporal 98 20 100 % 1.702 m (5' 7\") 59 kg (130 lb)     Physical Exam  BP (!) 111/106   Pulse 98   Temp 97.5  F (36.4  C) (Temporal)   Resp 20   Ht 1.702 m (5' 7\")   Wt 59 kg (130 lb)   LMP 2024 (Exact Date)   SpO2 100%   BMI 20.36 kg/m     General: Appears uncomfortable, intermittently having severe waves of pain.  Examined in room 34.  Accompanied by close friend.  Head: Atraumatic. Normocephalic.  EENT: PERRL. EOMI. Moist mucus membranes.   CV: Mildly tachycardic and regular rhythm.   Respiratory: Breathing " comfortably on room air. Lungs clear to auscultation bilaterally without wheezes, rhonchi, or rales.  GI: Soft, non-distended. Non-tender abdomen. No rebound, rigidity, or guarding.   Msk: Extremities without tenderness to palpation or deformity.  Pelvic: Chaperoned by Sandy ARREAGA.  Cervical os is closed.  There is moderate bleeding identified but no brisk flow.  No products of conception in the vaginal vault or cervical os.  Skin: Warm and dry. No rashes.  Neuro: Awake, alert, and conversant. No focal neurologic deficits.   Psych: Appropriate mood and affect.    Diagnostics   Lab Results   Labs Ordered and Resulted from Time of ED Arrival to Time of ED Departure   BASIC METABOLIC PANEL - Abnormal       Result Value    Sodium 137      Potassium 4.7      Chloride 102      Carbon Dioxide (CO2) 21 (*)     Anion Gap 14      Urea Nitrogen 4.8 (*)     Creatinine 0.51      GFR Estimate >90      Calcium 9.1      Glucose 96     HCG QUANTITATIVE PREGNANCY - Abnormal    hCG Quantitative 3,700 (*)    CBC WITH PLATELETS AND DIFFERENTIAL - Abnormal    WBC Count 8.9      RBC Count 3.76 (*)     Hemoglobin 13.1      Hematocrit 37.8       (*)     MCH 34.8 (*)     MCHC 34.7      RDW 14.0      Platelet Count 333      % Neutrophils 78      % Lymphocytes 12      % Monocytes 8      % Eosinophils 2      % Basophils 0      % Immature Granulocytes 0      NRBCs per 100 WBC 0      Absolute Neutrophils 6.9      Absolute Lymphocytes 1.1      Absolute Monocytes 0.7      Absolute Eosinophils 0.2      Absolute Basophils 0.0      Absolute Immature Granulocytes 0.0      Absolute NRBCs 0.0     INR   PARTIAL THROMBOPLASTIN TIME   FIBRINOGEN ACTIVITY   TYPE AND SCREEN, ADULT    ABO/RH(D) B POS      Antibody Screen Negative      SPECIMEN EXPIRATION DATE [id number]     ABO/RH TYPE AND SCREEN     Imaging  US OB <14 Weeks w Transvaginal   Final Result   IMPRESSION:    1.  Findings diagnostic of first trimester pregnancy failure and fetal   demise.  An intrauterine gestational sac is present within the lower   uterine segment containing an embryo measuring 11 mm with no fetal   cardiac activity. No subchorionic hemorrhage.   2.  No adnexal masses.   3.  Trace simple fluid in the pelvic cul-de-sac.         KANE ASHBY MD            SYSTEM ID:  [id number]        Independent Interpretation  None    ED Course    Medications Administered  Medications   HYDROmorphone (PF) (DILAUDID) injection 1 mg (has no administration in time range)   sodium chloride 0.9% BOLUS 1,000 mL (0 mLs Intravenous Stopped 24 1724)   acetaminophen (TYLENOL) tablet 1,000 mg (1,000 mg Oral $Given 24 1354)     Discussion of Management  OB/GYN, Dr. Hyde  Attending physician, Dr. Patiño.    Social Determinants of Health adding to complexity of care  Healthcare Access/Compliance    ED Course  1310 I performed my initial history and exam  1345 rechecked patient.  1500 Discussed results and plan  1545 Discussed patient with Dr. Patiño, attending physician  1650 spoke with Dr. Mcgregor from OB/GYN who agreed to come evaluate the patient in the ED.  1730 Patient informed me she would like to proceed with D&C [facility]    Medical Decision Making / Diagnosis   CMS Diagnoses: None    MIPS  None    MDM  Colleen Espino is a 24 year old female who is a G1, P0 female presenting today for evaluation of vaginal bleeding and abdominal pain in the setting of pregnancy.  Concern was for ruptured ectopic pregnancy versus miscarriage.  Vital signs stable upon arrival.  She was slightly tachycardic, but no hypotension.  There have been no syncopal events.  Hemoglobin stable at 13.1.  Abdomen soft diffusely tender.  She is having waves of severe pain here.  Ultrasound performed as above shows a missed  with embryo measuring at about 7 weeks.  Pelvic exam shows moderate amount of vaginal bleeding, nothing to suggest severe hemorrhage.  I spoke with OB/GYN who came and evaluated the patient in the ED.  Spoke  through options with her including D&C, medication management or expectant management and patient prefers to proceed with D&C.      Disposition  Patient will be brought to the OR under the care of Dr. Hyde.    In the meantime, care will be signed out to Dr. Tatum.    ICD-10 Codes:    ICD-10-CM    1. Missed  with fetal demise before 20 completed weeks of gestation  O02.1              Falguni Serrato PA-C  2024   [employer]        Falguni Serrato PA-C  24 1824

## 2024-06-24 NOTE — LETTER
June 24, 2024      To Whom It May Concern:    Joanna Olmstead accompanied a friend to the Emergency Room at [facility] on 6/24/24. That friend required surgery and needs someone to be present with her at her home on June 25th. Joanna will assume this role.     Sincerely,        Nish Hyde MD

## 2024-06-24 NOTE — ED PROVIDER NOTES
ED ATTENDING PHYSICIAN NOTE:   I evaluated this patient in conjunction with Falguni Serrato PA-C  I have participated in the care of the patient and personally performed key elements of the history, exam, and medical decision making.      HPI:   Colleen Espino is a  24 year old HIV-positive female approximately 11 weeks pregnant by last menstrual period presenting today for evaluation of abdominal pain bleeding.  Patient reports last night, she started to have onset of vaginal bleeding requiring changing pad 4 times yesterday.  This morning, she had persisting vaginal bleeding and is passing large clots.  She also started to have severe lower abdominal pain radiating into the back.  This was not a planned pregnancy.  She has not had any OB/GYN consultation for evaluation.  Ultrasound has not been performed.  She denies any lightheadedness, dizziness, abnormal vaginal bleeding.  This is her first pregnancy.  She is here with her best friend.   She reports no concerns for sexually transmitted infections, no other discharge aside from blood and clots. .     Independent Historian:   None    Review of External Notes:   I reviewed the ED visit from 2023, seen for acute cystitis  I reviewed the HIV viral load from 2023 which is elevated    EXAM:   General: Overall stable and nontoxic appearing  HEENT: Conjunctivae clear, no scleral icterus, mucous membranes moist  Neuro: Alert, moving all extremities equally with intention  CV: Regular rate and rhythm, radial and DP pulses equal  Respiratory: No signs of respiratory distress, lungs clear to auscultation bilaterally   Abdomen: Soft, without rigidity or rebound throughout, tender to palpation in suprapubic region   MSK: No lower extremity swelling or tenderness     Independent Interpretation (X-rays, CTs, rhythm strip):  None    Consultations/Discussion of Management or Tests:  Ob/Gyn per Falguni Serrato PA-C note      Social Determinants of Health affecting  care:   None     MEDICAL DECISION MAKING/ASSESSMENT AND PLAN:   24-year-old female G1, P0 who presents to the emergency department with vaginal bleeding abdominal pain, found to have a missed  on ultrasound.  With the IUP seen, lower suspicion for ruptured ectopic pregnancy.  No signs of hemorrhage on exam performed, please see PA note for full details. Hemoglobin wnl.  No fever, vomiting chills or other systemic signs to suggest infection related to retained POC.  Patient was discussed with OB/GYN and ultimately decision was made to proceed to the OR for D&C.  Patient stable at send at time of disposition.    DIAGNOSIS:     ICD-10-CM    1. HIV infection, unspecified symptom status (H)  B20 Internal Medicine Referral     Adult Infectious Disease  Referral      2. Missed  with fetal demise before 20 completed weeks of gestation  O02.1 hydrOXYzine dalila (VISTARIL) 25 MG capsule     acetaminophen (TYLENOL) 325 MG tablet     ibuprofen (ADVIL/MOTRIN) 600 MG tablet     DISCONTINUED: acetaminophen (TYLENOL) 325 MG tablet     DISCONTINUED: ibuprofen (ADVIL/MOTRIN) 600 MG tablet     DISCONTINUED: hydrOXYzine dalila (VISTARIL) 25 MG capsule      3. Does not have primary care provider  Z75.8 Internal Medicine Referral      4. Anxiety  F41.9 Internal Medicine Referral               DISPOSITION:   Send to OR      Celeste Vazquez MD  2024  [facility] EMERGENCY DEPT     Celeste Mead MD  24 0033

## 2024-06-24 NOTE — CONSULTS
"Gynecology Consult Note  [facility]    Consulting Provider: Falguni Serrato PA-C  Consulting Service: ED  Reason for Consult: Incomplete     HPI:   Colleen Espino is a 24 year old who presented to the ED with pelvic pain and bleeding. She is pregnant, and her LMP is Patient's last menstrual period was 2024 (exact date). The cramping pain has been very bad. She denies dizziness, lightheadedness, F/C. This was a surprise pregnancy but she was leaning towards keeping it. She is not using anything for contraception. She has been with the same partner for 2 years. She has a lot of anxiety over the last few months. Does not have a OB/GYN, PCP, or ID doctor.    ROS:   Negative except as per HPI    OB History:       GYN History:   Pap: Never  STI: Not tested    PMH:   HIV    PSHx:   No past surgical history on file.    Medications:   Dolutegravir  Odefsey  Current Outpatient Medications   Medication Instructions    albuterol (VENTOLIN HFA) 108 (90 BASE) MCG/ACT Inhaler 2 puffs, Inhalation, EVERY 4 HOURS PRN       Allergies:    No Known Allergies    Social History:        Physical Exam:   Patient Vitals for the past 24 hrs:   BP Temp Temp src Pulse Resp SpO2 Height Weight   24 1538 -- -- -- -- -- 100 % -- --   24 1523 (!) 111/106 -- -- -- -- -- -- --   24 1323 120/83 -- -- -- -- 100 % -- --   24 1309 129/71 97.5  F (36.4  C) Temporal 98 20 100 % 1.702 m (5' 7\") 59 kg (130 lb)     Gen:  Resting comfortably, NAD but tearful at times  CV:  Well perfused  Pulm:  NWOB  Abd:  Soft, mildly tender to palpation, non-distended  Ext:  Non-tender, no LE edema    Labs:  Results for orders placed or performed during the hospital encounter of 24   US OB <14 Weeks w Transvaginal     Status: None    Narrative    US OB <14 WEEKS WITH TRANSVAGINAL SINGLE 2024 3:33 PM    CLINICAL HISTORY: Vaginal bleeding, severe abd pain, ?ectopic  TECHNIQUE: Transabdominal scans were performed. " Endovaginal ultrasound  was performed to better visualize the embryo.    COMPARISON: None.    FINDINGS:  UTERUS: There is an intrauterine gestational sac with mean sac  diameter of 19 mm. This is located within the lower uterine segment.  An embryo is visualized but no yolk sac is visualized.  CRL: measures 11 mm, equals 7 weeks and 2 days.  FETAL HEART RATE: No fetal cardiac activity noted.  AMNIOTIC FLUID: Normal.  PLACENTA: Not yet formed. No evidence for sub-chorionic hemorrhage.    RIGHT OVARY: Normal, measuring 3.3 x 1.9 x 2.4 cm.  LEFT OVARY: Normal, measuring 2.8 x 1.4 x 1.3 cm.    No adnexal masses. A small amount of free fluid in the pelvic  cul-de-sac.      Impression    IMPRESSION:   1.  Findings diagnostic of first trimester pregnancy failure and fetal  demise. An intrauterine gestational sac is present within the lower  uterine segment containing an embryo measuring 11 mm with no fetal  cardiac activity. No subchorionic hemorrhage.  2.  No adnexal masses.  3.  Trace simple fluid in the pelvic cul-de-sac.      KANE ASHBY MD         SYSTEM ID:  [id number]   Basic metabolic panel     Status: Abnormal   Result Value Ref Range    Sodium 137 135 - 145 mmol/L    Potassium 4.7 3.4 - 5.3 mmol/L    Chloride 102 98 - 107 mmol/L    Carbon Dioxide (CO2) 21 (L) 22 - 29 mmol/L    Anion Gap 14 7 - 15 mmol/L    Urea Nitrogen 4.8 (L) 6.0 - 20.0 mg/dL    Creatinine 0.51 0.51 - 0.95 mg/dL    GFR Estimate >90 >60 mL/min/1.73m2    Calcium 9.1 8.6 - 10.0 mg/dL    Glucose 96 70 - 99 mg/dL   HCG quantitative pregnancy     Status: Abnormal   Result Value Ref Range    hCG Quantitative 3,700 (H) <5 mIU/mL   CBC with platelets and differential     Status: Abnormal   Result Value Ref Range    WBC Count 8.9 4.0 - 11.0 10e3/uL    RBC Count 3.76 (L) 3.80 - 5.20 10e6/uL    Hemoglobin 13.1 11.7 - 15.7 g/dL    Hematocrit 37.8 35.0 - 47.0 %     (H) 78 - 100 fL    MCH 34.8 (H) 26.5 - 33.0 pg    MCHC 34.7 31.5 - 36.5 g/dL    RDW  14.0 10.0 - 15.0 %    Platelet Count 333 150 - 450 10e3/uL    % Neutrophils 78 %    % Lymphocytes 12 %    % Monocytes 8 %    % Eosinophils 2 %    % Basophils 0 %    % Immature Granulocytes 0 %    NRBCs per 100 WBC 0 <1 /100    Absolute Neutrophils 6.9 1.6 - 8.3 10e3/uL    Absolute Lymphocytes 1.1 0.8 - 5.3 10e3/uL    Absolute Monocytes 0.7 0.0 - 1.3 10e3/uL    Absolute Eosinophils 0.2 0.0 - 0.7 10e3/uL    Absolute Basophils 0.0 0.0 - 0.2 10e3/uL    Absolute Immature Granulocytes 0.0 <=0.4 10e3/uL    Absolute NRBCs 0.0 10e3/uL   INR     Status: None   Result Value Ref Range    INR     Partial thromboplastin time     Status: Normal   Result Value Ref Range    aPTT 26 22 - 38 Seconds   Fibrinogen activity     Status: None   Result Value Ref Range    Fibrinogen Activity     INR     Status: Normal   Result Value Ref Range    INR 1.13 0.85 - 1.15   Fibrinogen activity     Status: Normal   Result Value Ref Range    Fibrinogen Activity 394 170 - 490 mg/dL   Adult Type and Screen     Status: None   Result Value Ref Range    ABO/RH(D) B POS     Antibody Screen Negative Negative    SPECIMEN EXPIRATION DATE [id number]    CBC with platelets differential     Status: Abnormal    Narrative    The following orders were created for panel order CBC with platelets differential.  Procedure                               Abnormality         Status                     ---------                               -----------         ------                     CBC with platelets and d...[[id number]]  Abnormal            Final result                 Please view results for these tests on the individual orders.   ABO/Rh type and screen     Status: None    Narrative    The following orders were created for panel order ABO/Rh type and screen.  Procedure                               Abnormality         Status                     ---------                               -----------         ------                     Adult Type and Screen[[id number]]                             Final result                 Please view results for these tests on the individual orders.        A&P:   Colleen Espino is a 24 year old G1 who presented to the ED with pelvic pain and bleeding and was found to have a incomplete  measuring 7w2d on US, with gestational in the lower uterine segment. She is hemodynamically stable. Medically uncomplicated other than diagnosis of HIV. We discussed management options including expectant, medical with misoprostol, and surgical with suction D&C and she prefers surgical management. She would also like a Nexplanon placed for contraception. The r/b/a were discussed and she agrees to proceed.    #Incomplete   - Orders for suction D&C under US guidance and insertion of Nexplanon  - Doxycyline for antibiotics  - CBC, T&S, coags  - Rh pos - rhogam not indicated  - Pap smear and GC/CT testing in OR  - Follow-up in clinic in about 2 weeks. I will arrange.  - Discharge with Tylenol, Ibuprofen, a few Vistaril for anxiety  - Will provide letter for work    #HIV  - Has been seen at  but does not have a PCP or ID doctor that she sees. Would like to be seen in [facility] system  - Referral for IM and ID placed for discharge.    Brent Hyde MD MPH  [facility]  2024 5:46 PM    32 minutes spent by me on the date of the encounter doing chart review, history and exam, documentation, counseling, and further activities per the note

## 2024-06-24 NOTE — ED TRIAGE NOTES
"Pt is , 11 weeks 3 days, LMP 2024, started vaginal bleeding yesterday. Pt reports passing \"large clots\". C/o severe cramping and pain in abdomen and low back.   Triage Assessment (Adult)       Row Name 24 1311          Triage Assessment    Airway WDL WDL        Respiratory WDL    Respiratory WDL WDL        Skin Circulation/Temperature WDL    Skin Circulation/Temperature WDL WDL        Cardiac WDL    Cardiac WDL WDL        Peripheral/Neurovascular WDL    Peripheral Neurovascular WDL WDL        Cognitive/Neuro/Behavioral WDL    Cognitive/Neuro/Behavioral WDL WDL                     "

## 2024-06-25 VITALS
DIASTOLIC BLOOD PRESSURE: 81 MMHG | BODY MASS INDEX: 20.4 KG/M2 | HEART RATE: 85 BPM | TEMPERATURE: 97.5 F | RESPIRATION RATE: 16 BRPM | SYSTOLIC BLOOD PRESSURE: 128 MMHG | HEIGHT: 67 IN | OXYGEN SATURATION: 100 % | WEIGHT: 130 LBS

## 2024-06-25 LAB
C TRACH DNA SPEC QL PROBE+SIG AMP: NEGATIVE
N GONORRHOEA DNA SPEC QL NAA+PROBE: NEGATIVE

## 2024-06-25 RX ORDER — ONDANSETRON 4 MG/1
4 TABLET, ORALLY DISINTEGRATING ORAL EVERY 30 MIN PRN
Status: DISCONTINUED | OUTPATIENT
Start: 2024-06-25 | End: 2024-06-25 | Stop reason: HOSPADM

## 2024-06-25 RX ORDER — OXYCODONE HYDROCHLORIDE 5 MG/1
5 TABLET ORAL
Status: DISCONTINUED | OUTPATIENT
Start: 2024-06-25 | End: 2024-06-25 | Stop reason: HOSPADM

## 2024-06-25 RX ORDER — OXYCODONE HYDROCHLORIDE 5 MG/1
10 TABLET ORAL
Status: DISCONTINUED | OUTPATIENT
Start: 2024-06-25 | End: 2024-06-25 | Stop reason: HOSPADM

## 2024-06-25 RX ORDER — HYDROXYZINE HYDROCHLORIDE 25 MG/1
25 TABLET, FILM COATED ORAL
Status: DISCONTINUED | OUTPATIENT
Start: 2024-06-25 | End: 2024-06-25 | Stop reason: HOSPADM

## 2024-06-25 RX ORDER — DEXAMETHASONE SODIUM PHOSPHATE 4 MG/ML
4 INJECTION, SOLUTION INTRA-ARTICULAR; INTRALESIONAL; INTRAMUSCULAR; INTRAVENOUS; SOFT TISSUE
Status: DISCONTINUED | OUTPATIENT
Start: 2024-06-25 | End: 2024-06-25 | Stop reason: HOSPADM

## 2024-06-25 RX ORDER — ONDANSETRON 2 MG/ML
4 INJECTION INTRAMUSCULAR; INTRAVENOUS EVERY 30 MIN PRN
Status: DISCONTINUED | OUTPATIENT
Start: 2024-06-25 | End: 2024-06-25 | Stop reason: HOSPADM

## 2024-06-25 RX ORDER — NALOXONE HYDROCHLORIDE 0.4 MG/ML
0.1 INJECTION, SOLUTION INTRAMUSCULAR; INTRAVENOUS; SUBCUTANEOUS
Status: DISCONTINUED | OUTPATIENT
Start: 2024-06-25 | End: 2024-06-25 | Stop reason: HOSPADM

## 2024-06-25 ASSESSMENT — ACTIVITIES OF DAILY LIVING (ADL): ADLS_ACUITY_SCORE: 35

## 2024-06-25 NOTE — DISCHARGE INSTRUCTIONS
You received 975 mg of Tylenol at 9:15 PM. Maximum acetaminophen (Tylenol) dose from all sources should not exceed 4 grams (4000 mg) per day or 1000 mg in 6 hours.   Do not take any Tylenol products until after 3:15 AM  You received Toradol, an IV form of Ibuprofen (Motrin) at 9:40 PM.  Do not take any Ibuprofen products until after 3:40 AM.

## 2024-06-25 NOTE — ANESTHESIA POSTPROCEDURE EVALUATION
Patient: Colleen Espino    Procedure: Procedure(s):  Dilation and curettage suction with ultrasound guidance and Nexplanon Implant       Anesthesia Type:  General    Note:  Disposition: Outpatient   Postop Pain Control: Uneventful   PONV: No   Neuro/Psych: Uneventful            Sign Out: Acceptable/Baseline neuro status   Airway/Respiratory: Uneventful            Sign Out: Acceptable/Baseline resp. status   CV/Hemodynamics: Uneventful            Sign Out: Acceptable CV status; No obvious hypovolemia; No obvious fluid overload   Other NRE: NONE   DID A NON-ROUTINE EVENT OCCUR? No           Last vitals:  Vitals Value Taken Time   /66 06/24/24 2235   Temp     Pulse 75 06/24/24 2236   Resp 13 06/24/24 2236   SpO2 100 % 06/24/24 2236   Vitals shown include unfiled device data.    Electronically Signed By: Hi Jackson MD  June 24, 2024  10:38 PM

## 2024-06-25 NOTE — ANESTHESIA PREPROCEDURE EVALUATION
Anesthesia Pre-Procedure Evaluation    Patient: Colleen Espino   MRN: [id number] : 2000        Procedure : Procedure(s):  Dilation and curettage suction with ultrasound guidance and Nexplanon Implant          No past medical history on file.   No past surgical history on file.   No Known Allergies   Social History     Tobacco Use    Smoking status: Not on file    Smokeless tobacco: Not on file   Substance Use Topics    Alcohol use: Not on file      Wt Readings from Last 1 Encounters:   24 59 kg (130 lb)        Anesthesia Evaluation   Pt has not had prior anesthetic         ROS/MED HX  ENT/Pulmonary:  - neg pulmonary ROS     Neurologic:  - neg neurologic ROS     Cardiovascular:  - neg cardiovascular ROS     METS/Exercise Tolerance:     Hematologic:  - neg hematologic  ROS     Musculoskeletal:  - neg musculoskeletal ROS     GI/Hepatic:  - neg GI/hepatic ROS     Renal/Genitourinary:  - neg Renal ROS     Endo:  - neg endo ROS     Psychiatric/Substance Use:  - neg psychiatric ROS     Infectious Disease:     (+)     HIV, TB,      Malignancy:  - neg malignancy ROS     Other: Comment: Fetal demise at 11 weeks with bleeding     (+) Possibly pregnant, , ,         Physical Exam    Airway        Mallampati: I   TM distance: > 3 FB   Neck ROM: full   Mouth opening: > 3 cm    Respiratory Devices and Support         Dental           Cardiovascular   cardiovascular exam normal       Rhythm and rate: regular and normal     Pulmonary   pulmonary exam normal        breath sounds clear to auscultation       Other findings: Lab Test        06/24/24     10/03/23                       1352          0003          WBC          8.9          7.4           HGB          13.1         14.1          MCV          101*         92            PLT          333          312            Lab Test        06/24/24     10/03/23                       1352          0003          NA           137          135           POTASSIUM    4.7          4.4   "         CHLORIDE     102          100           CO2          21*          24            BUN          4.8*         8.0           CR           0.51         0.62          ANIONGAP     14           11            WANDA          9.1          9.2           GLC          96           102*                     OUTSIDE LABS:  CBC:   Lab Results   Component Value Date    WBC 8.9 06/24/2024    WBC 7.4 10/03/2023    HGB 13.1 06/24/2024    HGB 14.1 10/03/2023    HCT 37.8 06/24/2024    HCT 41.8 10/03/2023     06/24/2024     10/03/2023     BMP:   Lab Results   Component Value Date     06/24/2024     10/03/2023    POTASSIUM 4.7 06/24/2024    POTASSIUM 4.4 10/03/2023    CHLORIDE 102 06/24/2024    CHLORIDE 100 10/03/2023    CO2 21 (L) 06/24/2024    CO2 24 10/03/2023    BUN 4.8 (L) 06/24/2024    BUN 8.0 10/03/2023    CR 0.51 06/24/2024    CR 0.62 10/03/2023    GLC 96 06/24/2024     (H) 10/03/2023     COAGS:   Lab Results   Component Value Date    INR  06/24/2024      Comment:      Received with clot in tube-results in question.   This is a corrected result. Previous result was 1.07 on 6/24/2024 at  6:45 PM CDT    FIBR  06/24/2024      Comment:      Received with clot in tube-results in question.   This is a corrected result. Previous result was 270 mg/dL on 6/24/2024 at  6:17 PM CDT     POC:   Lab Results   Component Value Date    HCG Negative 12/12/2023     HEPATIC: No results found for: \"ALBUMIN\", \"PROTTOTAL\", \"ALT\", \"AST\", \"GGT\", \"ALKPHOS\", \"BILITOTAL\", \"BILIDIRECT\", \"RACHELLE\"  OTHER:   Lab Results   Component Value Date    WANDA 9.1 06/24/2024       Anesthesia Plan    ASA Status:  2    NPO Status:  NPO Appropriate    Anesthesia Type: General.     - Airway: LMA   Induction: Intravenous.   Maintenance: Balanced.        Consents    Anesthesia Plan(s) and associated risks, benefits, and realistic alternatives discussed. Questions answered and patient/representative(s) expressed understanding.     - Discussed: " Risks, Benefits and Alternatives for BOTH SEDATION and the PROCEDURE were discussed     - Discussed with:  Patient      - Extended Intubation/Ventilatory Support Discussed: No.      - Patient is DNR/DNI Status: No     Use of blood products discussed: No .     Postoperative Care    Pain management: IV analgesics, Oral pain medications, Multi-modal analgesia.   PONV prophylaxis: Ondansetron (or other 5HT-3), Dexamethasone or Solumedrol, Background Propofol Infusion     Comments:               Hi Jackson MD    I have reviewed the pertinent notes and labs in the chart from the past 30 days and (re)examined the patient.  Any updates or changes from those notes are reflected in this note.

## 2024-06-25 NOTE — ANESTHESIA PROCEDURE NOTES
Airway       Patient location during procedure: OR  Staff -        CRNA: Anitra Casanova APRN CRNA       Performed By: CRNAIndications and Patient Condition       Indications for airway management: trixie-procedural       Induction type:intravenous       Mask difficulty assessment: 1 - vent by mask    Final Airway Details       Final airway type: supraglottic airway    Supraglottic Airway Details        Type: LMA       Brand: I-Gel       LMA size: 4    Post intubation assessment        Placement verified by: capnometry, equal breath sounds and chest rise        Number of attempts at approach: 1       Number of other approaches attempted: 0       Secured with: commercial tube carr       Ease of procedure: easy       Dentition: Intact and Unchanged

## 2024-06-25 NOTE — ANESTHESIA CARE TRANSFER NOTE
Patient: Colleen Espino    Procedure: Procedure(s):  Dilation and curettage suction with ultrasound guidance and Nexplanon Implant       Diagnosis: Spontaneous  [O03.9]  Diagnosis Additional Information: No value filed.    Anesthesia Type:   General     Note:    Oropharynx: oropharynx clear of all foreign objects  Level of Consciousness: awake and drowsy  Oxygen Supplementation: face mask  Level of Supplemental Oxygen (L/min / FiO2): 8  Independent Airway: airway patency satisfactory and stable  Dentition: dentition unchanged  Vital Signs Stable: post-procedure vital signs reviewed and stable  Report to RN Given: handoff report given  Patient transferred to: PACU    Handoff Report: Identifed the Patient, Identified the Reponsible Provider, Reviewed the pertinent medical history, Discussed the surgical course, Reviewed Intra-OP anesthesia mangement and issues during anesthesia, Set expectations for post-procedure period and Allowed opportunity for questions and acknowledgement of understanding      Vitals:  Vitals Value Taken Time   /92 24 2240   Temp     Pulse 89 24 2241   Resp 11 24 2241   SpO2 100 % 24 2241   Vitals shown include unfiled device data.    Electronically Signed By: IRINA Randle CRNA  2024  10:42 PM

## 2024-06-25 NOTE — OP NOTE
[facility]  Operative Note    Date of service: 2024    Name: Colleen Espino   MRN: [id number]   : 2000     Surgeon: Brent Hyde MD MPH    Pre-operative diagnosis:  - Incomplete  measuring 7w2d  - Desire for Nexplanon contraception    Post-operative diagnosis:  - Same    Procedure:  Dilation and curettage suction with ultrasound guidance and Nexplanon insertion    Anesthesia: GETA and local  Fluids: 500mL  EBL: 20 mL  UOP: Voided on call to OR  Antibiotics:200 mg PO Doxycycline  Complications: None    Specimen:  ID Type Source Tests Collected by Time Destination   A : PRODUCTS OF CONCEPTION Products of Conception Products of Conception CULTURE ONLY SKIN/TISSUE CYTOGENETICS Nish Hyde MD 2024  9:05 PM    - Gonorrhea/Chlamydia Swab  - Pap smear    Findings:  Exam under anesthesia revealed normal external female genitalia. Cervix dilated 1-2 cm with products of conception seen at the os. No bleeding. Uterus small, mobile, anteverted. No fetal heartbeat was seen on US in the OR prior to starting the procedure. Products of conception visualized through suction tubing and gross examination at end of case. Endometrium with diffusely gritty texture at close of case. Bleeding minimal at close of case.    Indication:   Colleen Espino was diagnosed with an incomplete  measuring 7w2d. Management options were discussed and she desired definitive management with suction D&C. She also desired Nexplanon placement for contraception. The risks, benefits, and alternatives of procedure were discussed. Informed consent was signed prior to the procedure, including consent for blood transfusion if indicated.    Procedure:  Patient was taken to the OR where she underwent GETA anesthesia without difficulty. She was then positioned in the dorsal lithotomy position using yellow-fin stirrups. A surgical time out was performed. A sterile speculum was placed in the vagina and the  cervix was visualized. Gonorrhea/Chlamydia swab and pap smear were collected. Speculum was removed.    She was then prepped and draped in the normal sterile fashion. New speculum was placed. The products of conception seen at the os were removed with ring forceps. The anterior lip of the cervix was grasped with a ring forceps. The 4 and 8 o'clock positions of the cervical vaginal junction were then injected with a total of mL of  1% lidocaine plain. The cervix was easily passively dilated to 27 [place] with Asif dilators. A size 9 mm firm, curved suction tip was then placed into the cervical canal without difficulty. Multiple passes were made with good return of tissue noted. The cannula was then downsized to a size 8 mm. Suction was continued until minimal return of tissue was noted and all aspects of endometrium were noted to be gritty. The suction currette was removed. There was minimal bleeding noted from the cervix. Ultrasound guidance confirmed an empty uterus with no flow on doppler.    Attention was then turned to the Nexplanon insertion. The patient was positioned with her left arm flexed at the elbow and externally rotated. A ruler was used to mary an area of the skin 8-10 cm superior from the medical epicondyle. This area was then cleaned with betadine x3. Lidocaine 1% plain was injected subdermally. The nexplanon was removed from the packaging and assembled according to the manufacture's instructions. The needle bore of the nexplanon device was placed at the marked site and inserted subdermally prior to trigger deployment. The device was easily palpated below the skin. A pressure dressing was applied to the area of the incision with a Band-aid, and wrapped with Coban to help hold pressure at the site.     Attention was again turned to the pelvis. Speculum was reinserted and bleeding remained scant. The speculum was then removed. The patient tolerated the procedure well and was taken to the PACU for  recovery in stable condition. Instrument, needle and sponge counts correct x2.    Brent Hyde MD MPH  [facility]  06/24/2024 8:18 PM

## 2024-06-26 ENCOUNTER — TELEPHONE (OUTPATIENT)
Dept: OBGYN | Facility: CLINIC | Age: 24
End: 2024-06-26

## 2024-06-26 LAB
PATH REPORT.COMMENTS IMP SPEC: NORMAL
PATH REPORT.COMMENTS IMP SPEC: NORMAL
PATH REPORT.FINAL DX SPEC: NORMAL
PATH REPORT.GROSS SPEC: NORMAL
PATH REPORT.MICROSCOPIC SPEC OTHER STN: NORMAL
PATH REPORT.RELEVANT HX SPEC: NORMAL
PHOTO IMAGE: NORMAL

## 2024-06-26 NOTE — TELEPHONE ENCOUNTER
----- Message from Nish Hernandezon sent at 2024  4:35 PM CDT -----  Regarding: Expecting phone call  Hello:    I did D&C for this patient on Monday night for an incomplete . I asked the schedulers to call her to set her up for a 2 week follow-up with me, but it turns out her phone is not working. I called one of her friends today and that friend is going to give Colleen the [phone] phone number to the clinic and instructed her to ask to speak to one of the triage nurses. When she calls could you please help to schedule her for a follow-up visit with me sometime in the next 2 weeks and also let her know the results of the pathology exam and GC/CT test that were completed?    Thanks so much, and please let me know if you have any questions. I realize this is sort of a strange request but this patient is fairly high risk (carries a diagnosis of HIV) and I want to make sure she doesn't slip through the cracks!!    Brent

## 2024-06-27 LAB
BKR LAB AP GYN ADEQUACY: NORMAL
BKR LAB AP GYN INTERPRETATION: NORMAL
BKR LAB AP HPV REFLEX: NO
BKR LAB AP LMP: NORMAL
BKR LAB AP PREVIOUS ABNORMAL: NORMAL
PATH REPORT.COMMENTS IMP SPEC: NORMAL
PATH REPORT.COMMENTS IMP SPEC: NORMAL
PATH REPORT.RELEVANT HX SPEC: NORMAL

## 2024-07-10 ENCOUNTER — TELEPHONE (OUTPATIENT)
Dept: OBGYN | Facility: CLINIC | Age: 24
End: 2024-07-10

## 2024-07-10 NOTE — TELEPHONE ENCOUNTER
Yes let's have her get a pelvic ultrasound and urine culture as well.     Please help to get her scheduled for a follow-up visit with me. Can add her to an admin slot.     Please also relay her lab results:  - Gross pathology from procedure was normal. The genetic testing is still in process.  - Gonorrhea and chlamydia are negative.  - Pap smear was normal    Thanks,  Nish Hyde MD

## 2024-07-10 NOTE — TELEPHONE ENCOUNTER
Had D&C 6/24/24. Pt missed her f/up appt as she mixed her dates up.    She states she is having a a lot of pain while urinating. She states it does not feel like UTI pain, but more an internal pain.   Pain is not present otherwise, only when urinating. I mention kidney stones but pt states again that she has had them and his feels different.    No fever, has some body aches.    I was going to advise UC, but do you want her to have an ultrasound? She also mentions intercourse was very painful (separate from this) for her first time trying since the surgery.    Savanna SANCHES RN BSN

## 2024-07-10 NOTE — TELEPHONE ENCOUNTER
Caller reporting the following red-flag symptom(s): Pt had D&C on 6/24 and is now having severe vaginal pain, pelvic pain, and pain when urinating. Pt also having lower back pain    Per the system red-flag symptom policy, patient was instructed to:  speak with a Registered Nurse    Action:  Patient warm transferred to a Registered Nurse

## 2024-07-11 NOTE — TELEPHONE ENCOUNTER
Tried to call pt , phone not working. Sent note to call us to her email.    Savanna SANCHES RN BSN

## 2024-07-26 LAB
INTERPRETATION: NORMAL
Lab: NORMAL

## 2024-08-01 PROCEDURE — 99284 EMERGENCY DEPT VISIT MOD MDM: CPT | Performed by: EMERGENCY MEDICINE

## 2024-08-02 ENCOUNTER — TELEPHONE (OUTPATIENT)
Dept: NURSING | Facility: CLINIC | Age: 24
End: 2024-08-02

## 2024-08-02 ENCOUNTER — HOSPITAL ENCOUNTER (EMERGENCY)
Facility: CLINIC | Age: 24
Discharge: HOME | End: 2024-08-02
Attending: EMERGENCY MEDICINE | Admitting: EMERGENCY MEDICINE
Payer: MEDICAID

## 2024-08-02 ENCOUNTER — TELEPHONE (OUTPATIENT)
Dept: INFECTIOUS DISEASES | Facility: CLINIC | Age: 24
End: 2024-08-02

## 2024-08-02 VITALS
RESPIRATION RATE: 18 BRPM | BODY MASS INDEX: 20.83 KG/M2 | HEIGHT: 67 IN | DIASTOLIC BLOOD PRESSURE: 72 MMHG | HEART RATE: 88 BPM | WEIGHT: 132.72 LBS | TEMPERATURE: 97.3 F | OXYGEN SATURATION: 98 % | SYSTOLIC BLOOD PRESSURE: 128 MMHG

## 2024-08-02 DIAGNOSIS — L29.9 ITCHING: ICD-10-CM

## 2024-08-02 LAB
ALBUMIN SERPL BCG-MCNC: 4 G/DL (ref 3.5–5.2)
ALP SERPL-CCNC: 120 U/L (ref 40–150)
ALT SERPL W P-5'-P-CCNC: 43 U/L (ref 0–50)
ANION GAP SERPL CALCULATED.3IONS-SCNC: 13 MMOL/L (ref 7–15)
AST SERPL W P-5'-P-CCNC: 65 U/L (ref 0–45)
BASOPHILS # BLD AUTO: 0 10E3/UL (ref 0–0.2)
BASOPHILS NFR BLD AUTO: 0 %
BILIRUB SERPL-MCNC: 0.9 MG/DL
BUN SERPL-MCNC: 5 MG/DL (ref 6–20)
CALCIUM SERPL-MCNC: 9.6 MG/DL (ref 8.8–10.4)
CD3 CELLS # BLD: 1351 CELLS/UL (ref 603–2990)
CD3 CELLS NFR BLD: 82 % (ref 49–84)
CD3+CD4+ CELLS # BLD: 276 CELLS/UL (ref 441–2156)
CD3+CD4+ CELLS NFR BLD: 17 % (ref 28–63)
CD3+CD4+ CELLS/CD3+CD8+ CLL BLD: 0.28 % (ref 1.4–2.6)
CD3+CD8+ CELLS # BLD: 977 CELLS/UL (ref 125–1312)
CD3+CD8+ CELLS NFR BLD: 59 % (ref 10–40)
CHLORIDE SERPL-SCNC: 99 MMOL/L (ref 98–107)
CREAT SERPL-MCNC: 0.64 MG/DL (ref 0.51–0.95)
EGFRCR SERPLBLD CKD-EPI 2021: >90 ML/MIN/1.73M2
EOSINOPHIL # BLD AUTO: 0.6 10E3/UL (ref 0–0.7)
EOSINOPHIL NFR BLD AUTO: 9 %
ERYTHROCYTE [DISTWIDTH] IN BLOOD BY AUTOMATED COUNT: 14.1 % (ref 10–15)
GLUCOSE SERPL-MCNC: 89 MG/DL (ref 70–99)
HCO3 SERPL-SCNC: 22 MMOL/L (ref 22–29)
HCT VFR BLD AUTO: 43.2 % (ref 35–47)
HGB BLD-MCNC: 14.3 G/DL (ref 11.7–15.7)
IMM GRANULOCYTES # BLD: 0 10E3/UL
IMM GRANULOCYTES NFR BLD: 0 %
LYMPHOCYTES # BLD AUTO: 1.9 10E3/UL (ref 0.8–5.3)
LYMPHOCYTES NFR BLD AUTO: 31 %
MCH RBC QN AUTO: 33.7 PG (ref 26.5–33)
MCHC RBC AUTO-ENTMCNC: 33.1 G/DL (ref 31.5–36.5)
MCV RBC AUTO: 102 FL (ref 78–100)
MONOCYTES # BLD AUTO: 0.4 10E3/UL (ref 0–1.3)
MONOCYTES NFR BLD AUTO: 7 %
NEUTROPHILS # BLD AUTO: 3.1 10E3/UL (ref 1.6–8.3)
NEUTROPHILS NFR BLD AUTO: 52 %
NRBC # BLD AUTO: 0 10E3/UL
NRBC BLD AUTO-RTO: 0 /100
PLATELET # BLD AUTO: 265 10E3/UL (ref 150–450)
POTASSIUM SERPL-SCNC: 4.3 MMOL/L (ref 3.4–5.3)
PROT SERPL-MCNC: 9.3 G/DL (ref 6.4–8.3)
RBC # BLD AUTO: 4.24 10E6/UL (ref 3.8–5.2)
SODIUM SERPL-SCNC: 134 MMOL/L (ref 135–145)
T CELL COMMENT: ABNORMAL
WBC # BLD AUTO: 6 10E3/UL (ref 4–11)

## 2024-08-02 PROCEDURE — 250N000013 HC RX MED GY IP 250 OP 250 PS 637: Performed by: EMERGENCY MEDICINE

## 2024-08-02 PROCEDURE — 85025 COMPLETE CBC W/AUTO DIFF WBC: CPT | Performed by: EMERGENCY MEDICINE

## 2024-08-02 PROCEDURE — 87536 HIV-1 QUANT&REVRSE TRNSCRPJ: CPT | Performed by: EMERGENCY MEDICINE

## 2024-08-02 PROCEDURE — 86359 T CELLS TOTAL COUNT: CPT | Performed by: EMERGENCY MEDICINE

## 2024-08-02 PROCEDURE — 36415 COLL VENOUS BLD VENIPUNCTURE: CPT | Performed by: EMERGENCY MEDICINE

## 2024-08-02 PROCEDURE — 80053 COMPREHEN METABOLIC PANEL: CPT | Performed by: EMERGENCY MEDICINE

## 2024-08-02 RX ORDER — HYDROXYZINE HYDROCHLORIDE 25 MG/1
25 TABLET, FILM COATED ORAL ONCE
Status: COMPLETED | OUTPATIENT
Start: 2024-08-02 | End: 2024-08-02

## 2024-08-02 RX ORDER — HYDROXYZINE HYDROCHLORIDE 25 MG/1
25-50 TABLET, FILM COATED ORAL 3 TIMES DAILY PRN
Qty: 20 TABLET | Refills: 0 | Status: SHIPPED | OUTPATIENT
Start: 2024-08-02

## 2024-08-02 RX ORDER — TRIAMCINOLONE ACETONIDE 1 MG/G
CREAM TOPICAL 2 TIMES DAILY
Qty: 80 G | Refills: 0 | Status: SHIPPED | OUTPATIENT
Start: 2024-08-02

## 2024-08-02 RX ADMIN — HYDROXYZINE HYDROCHLORIDE 25 MG: 25 TABLET ORAL at 00:42

## 2024-08-02 RX ADMIN — HYDROXYZINE HYDROCHLORIDE 25 MG: 25 TABLET ORAL at 02:00

## 2024-08-02 ASSESSMENT — ACTIVITIES OF DAILY LIVING (ADL)
ADLS_ACUITY_SCORE: 35
ADLS_ACUITY_SCORE: 35

## 2024-08-02 NOTE — DISCHARGE INSTRUCTIONS
I have placed referrals for infectious disease as well as dermatology to further investigate your rash.  Rashes sometimes can be related to HIV and is very important that you follow-up to ensure your rash is not secondary to that.  Your labs look reassuring but your HIV labs are still pending.  May use Atarax as needed for itching.  May try topical steroids twice daily until seen by dermatology for definitive diagnosis.    Return the ER if having worsening fevers, chills, nausea or vomiting or other concerns.  
8

## 2024-08-02 NOTE — TELEPHONE ENCOUNTER
[facility]    Reason for call: Lab Result Notification     Lab Result (including Rx patient on, if applicable).  If culture, copy of lab report at bottom.  Lab Result: T Cell Subset profile        Creatinine Level (mg/dl)   Creatinine   Date Value Ref Range Status   08/02/2024 0.64 0.51 - 0.95 mg/dL Final    Creatinine clearance (ml/min), if applicable    Serum creatinine: 0.64 mg/dL 08/02/24 0043  Estimated creatinine clearance: 131 mL/min     RN Recommendations/Instructions per [place] ED lab result protocol:   [facility] ED lab result protocol utilized: Misc Labs    Unable to reach patient/caregiver. Voice mailbox is not set up, unable to leave a message. Will fax results to patient's infectious disease provider, Dr. Segundo. Fax # [phone]    Letter pended to be sent via [employer]..    Dorothy Luna RN

## 2024-08-02 NOTE — ED PROVIDER NOTES
"  Emergency Department Note      History of Present Illness     Chief Complaint   Rash      HPI   Colleen Espino is a 24 year old female with a history of HIV who presents to the ED with a rash. The patient reports she began breaking out with an itchy rash and hives all over her body 3 days ago. She states she went to urgent care and was given zyrtec, prednisone, and benadryl but none of the medications were working. She states she is itching every second. She states she put Kalava on her body which helped the bumps on her skin go down. She denies new soaps/shampoos/detergent, difficulty breathing, fever, chest pain, shortness of breath, or edema. She notes she began using birth control. She also notes she has HIV and has been off her medications for that for 4 years now. She denies history of sickle cell anemia.    Independent Historian   None    Review of External Notes   Reviewed infectious disease visit from 12/4/2023 regarding patient's HIV positive status and ongoing management    Past Medical History     Medical History and Problem List   HIV positive  Nearsitedness  Tuberculosis  Vitamin D deficiency    Medications   Dolutegravir  Jvhoejfvsy-rxrzwshj-oepzqch  Fluticasone propionate  Hydroxyzine pamoate  Etonogestrel  Cetrizine  Prednisone    Surgical History   Dilation and curettage suction with ultrasound guidance  Implant hormone (L)  Papanicolaou smear    Physical Exam     Patient Vitals for the past 24 hrs:   BP Temp Temp src Pulse Resp SpO2 Height Weight   08/02/24 0208 128/72 -- -- 88 18 98 % -- --   08/01/24 2332 (!) 144/77 97.3  F (36.3  C) Temporal 103 18 100 % 1.702 m (5' 7\") 60.2 kg (132 lb 11.5 oz)     Physical Exam  General: Resting on the bed.  Head: No obvious trauma to head.  Ears, Nose, Throat:  External ears normal.  Nose normal.  No pharyngeal erythema, swelling or exudate.  Midline uvula.  No oral lesions.    Eyes:  Conjunctivae clear.  Pupils are equal, round, and reactive.   Neck: " Normal range of motion.  Neck supple.   CV: Regular rate and rhythm.  No murmurs.      Respiratory: Effort normal and breath sounds normal.  No wheezing or crackles.   Gastrointestinal: Soft.  No distension. There is no tenderness.    Neuro: Alert. Moving all extremities appropriately.  Normal speech.    Skin: Skin is warm and dry.  Diffuse maculopapular rash appreciable over the extremities, torso.  Stigmata of itching with excoriations noted.  No erythema, warmth or induration.  Diagnostics     Lab Results   Labs Ordered and Resulted from Time of ED Arrival to Time of ED Departure   COMPREHENSIVE METABOLIC PANEL - Abnormal       Result Value    Sodium 134 (*)     Potassium 4.3      Carbon Dioxide (CO2) 22      Anion Gap 13      Urea Nitrogen 5.0 (*)     Creatinine 0.64      GFR Estimate >90      Calcium 9.6      Chloride 99      Glucose 89      Alkaline Phosphatase 120      AST 65 (*)     ALT 43      Protein Total 9.3 (*)     Albumin 4.0      Bilirubin Total 0.9     CBC WITH PLATELETS AND DIFFERENTIAL - Abnormal    WBC Count 6.0      RBC Count 4.24      Hemoglobin 14.3      Hematocrit 43.2       (*)     MCH 33.7 (*)     MCHC 33.1      RDW 14.1      Platelet Count 265      % Neutrophils 52      % Lymphocytes 31      % Monocytes 7      % Eosinophils 9      % Basophils 0      % Immature Granulocytes 0      NRBCs per 100 WBC 0      Absolute Neutrophils 3.1      Absolute Lymphocytes 1.9      Absolute Monocytes 0.4      Absolute Eosinophils 0.6      Absolute Basophils 0.0      Absolute Immature Granulocytes 0.0      Absolute NRBCs 0.0     HIV-1 RNA QUANTITATIVE   T CELL SUBSET PROFILE       Imaging   No orders to display       Independent Interpretation   None    ED Course      Medications Administered   Medications   hydrOXYzine HCl (ATARAX) tablet 25 mg (25 mg Oral $Given 8/2/24 0042)   hydrOXYzine HCl (ATARAX) tablet 25 mg (25 mg Oral $Given 8/2/24 0200)         Discussion of Management   None    ED Course    ED Course as of 08/02/24 0245   Fri Aug 02, 2024   0018 I obtained history and examined the patient as noted above.   0146 I reassessed the patient and explained results.       Additional Documentation  None    Medical Decision Making / Diagnosis     CMS Diagnoses: None    MIPS       None    MDM   Colleen Espino is a 24 year old female who presents emergency department for itching.  Vital signs are stable.  Broad differentials considered include not limited to dermatitis, allergic reaction, angioedema, cellulitis, abscess, complications of HIV AIDS, etc.  CBC without leukocytosis or anemia.  BMP shows no acute electrolyte, metabolic or renal dysfunction.  LFTs are grossly unremarkable.  Normal bilirubin.  I am concerned that this may be an AIDS related folliculitis as patient is not on medications and has not recently had any testing done regarding her HIV viral load and CD4 count.  Therefore I have ordered these tests as well as an infectious disease follow-up.  Patient has no angioedema, no signs of severe allergic reaction or anaphylaxis at this time.  She is not improving on prednisone or Zyrtec.  There is no clinical evidence of cellulitis, abscess etc.  This may represent a dermatitis but it is rather diffuse therefore heightening my concern for possible age-related folliculitis.  I have also given a dermatology referral.  She had some improvement with the Atarax.  Will try a topical triamcinolone cream to see if this would help with some of the itching as well.  I have voiced my concerns that this may be related to her underlying HIV AIDS and recommend that she follow-up closely with infectious disease and dermatology for ongoing management.  Discussed return precautions with any worsening rash, fevers etc.  Patient is agreeable.  She also notes that the labs for CD4 and viral count are pending and she will follow-up with her doctors to discuss these.    Disposition   The patient was discharged.      Diagnosis     ICD-10-CM    1. Itching  L29.9 Adult Infectious Disease  Referral     Adult Dermatology  Referral           Discharge Medications   Discharge Medication List as of 8/2/2024  2:01 AM        START taking these medications    Details   hydrOXYzine HCl (ATARAX) 25 MG tablet Take 1-2 tablets (25-50 mg) by mouth 3 times daily as needed for itching, Disp-20 tablet, R-0, E-Prescribe      triamcinolone (KENALOG) 0.1 % external cream Apply topically 2 times dailyDisp-80 g, [id number]               Scribe Disclosure:  I, Cameron Will, am serving as a scribe at 12:46 AM on 8/2/2024 to document services personally performed by Elsa Hammond MD based on my observations and the provider's statements to me.        Elsa Hammond MD  08/02/24 0248

## 2024-08-02 NOTE — LETTER
August 2, 2024        Colleen Espino  [address]   [place] [address]          Dear Colleen Espino:    You were seen in the [facility] Emergency Department at [facility] on 8/2/2024.  We are unable to reach you by phone, so we are sending you this letter.     It is important that you call [facility] Emergency Department lab result nurse at [phone], as we have information to relay to you AND/OR we MAY have to make some changes in your treatment.    Best time to call back is between 9AM and 5:30PM, 7 days a week.      Sincerely,     [facility] Emergency Department Lab Result RN  [phone]

## 2024-08-02 NOTE — ED TRIAGE NOTES
Presents to triage with c/o full body itchy rash. Patient states it started on her stomach and then 3 days ago it spread everywhere. She was seen at  and started on benadryl, zyrtec and prednisone but symptoms have not been relieved.

## 2024-08-02 NOTE — TELEPHONE ENCOUNTER
EP called 8/2 to ask if she's [facility] from [facility] to [facility], she said no but wanted to still sched. Reaching out to ID team about what the next steps are.     Dannie Melara, RN  [id number]  Please verify with patient if she is transferring care as she has an established ID provider at [facility]

## 2024-08-03 LAB
HIV1 RNA # PLAS NAA DL=20: ABNORMAL COPIES/ML
HIV1 RNA SERPL NAA+PROBE-LOG#: 5.1 {LOG_COPIES}/ML

## 2024-08-03 NOTE — TELEPHONE ENCOUNTER
Third attempt to reach patient. There is no voicemail box set up to leave a message. Lab results were faxed to provider.    Dorothy Luna RN  08/03/24 3:03 PM  [facility]  Emergency Department Lab Results RN

## 2024-08-03 NOTE — TELEPHONE ENCOUNTER
[facility]    Reason for call: Lab Result Notification     Lab Result (including Rx patient on, if applicable).  If culture, copy of lab report at bottom.  Lab Result:     Component      Latest Ref Rng 8/2/2024  12:43 AM   HIV-1 RNA Copies/mL, Instrument      Not Detected copies/mL 118,000 (H)       Legend:  (H) High    Creatinine Level (mg/dl)   Creatinine   Date Value Ref Range Status   08/02/2024 0.64 0.51 - 0.95 mg/dL Final    Creatinine clearance (ml/min), if applicable    Serum creatinine: 0.64 mg/dL 08/02/24 0043  Estimated creatinine clearance: 131 mL/min     RN Recommendations/Instructions per [place] ED lab result protocol:   [facility] ED lab result protocol utilized: Misc labs protocol    Unable to reach patient/caregiver.     Left voicemail message requesting a call back to [phone] between 9 a.m. and 5:30 p.m. for patient's ED/UC lab results.      Letter pended to be sent via [employer] mail.  Will also fax results to infectious disease provider - Dr. Tima Luna RN

## 2024-08-07 ENCOUNTER — TELEPHONE (OUTPATIENT)
Dept: DERMATOLOGY | Facility: CLINIC | Age: 24
End: 2024-08-07
Payer: MEDICAID

## 2024-08-07 ENCOUNTER — HOSPITAL ENCOUNTER (EMERGENCY)
Facility: CLINIC | Age: 24
Discharge: HOME | End: 2024-08-07
Attending: EMERGENCY MEDICINE | Admitting: EMERGENCY MEDICINE
Payer: MEDICAID

## 2024-08-07 VITALS
WEIGHT: 132.72 LBS | DIASTOLIC BLOOD PRESSURE: 90 MMHG | TEMPERATURE: 97.9 F | BODY MASS INDEX: 20.79 KG/M2 | RESPIRATION RATE: 18 BRPM | OXYGEN SATURATION: 100 % | HEART RATE: 91 BPM | SYSTOLIC BLOOD PRESSURE: 125 MMHG

## 2024-08-07 DIAGNOSIS — L28.2 PRURITIC RASH: ICD-10-CM

## 2024-08-07 DIAGNOSIS — R21 RASH: ICD-10-CM

## 2024-08-07 PROCEDURE — 99284 EMERGENCY DEPT VISIT MOD MDM: CPT

## 2024-08-07 RX ORDER — DOXEPIN HYDROCHLORIDE 50 MG/G
CREAM TOPICAL 4 TIMES DAILY
Qty: 45 G | Refills: 0 | Status: SHIPPED | OUTPATIENT
Start: 2024-08-07 | End: 2024-09-06

## 2024-08-07 RX ORDER — ITRACONAZOLE 100 MG/1
200 CAPSULE ORAL DAILY
Qty: 28 CAPSULE | Refills: 0 | Status: SHIPPED | OUTPATIENT
Start: 2024-08-07 | End: 2024-08-21

## 2024-08-07 ASSESSMENT — COLUMBIA-SUICIDE SEVERITY RATING SCALE - C-SSRS
1. IN THE PAST MONTH, HAVE YOU WISHED YOU WERE DEAD OR WISHED YOU COULD GO TO SLEEP AND NOT WAKE UP?: NO
6. HAVE YOU EVER DONE ANYTHING, STARTED TO DO ANYTHING, OR PREPARED TO DO ANYTHING TO END YOUR LIFE?: NO
2. HAVE YOU ACTUALLY HAD ANY THOUGHTS OF KILLING YOURSELF IN THE PAST MONTH?: NO

## 2024-08-07 NOTE — DISCHARGE INSTRUCTIONS
Follow-up with your infectious disease doctor as well as dermatology for further evaluation.    Please take medications as previously prescribed by either take hydroxyzine or Benadryl, not both together.    Please return to the emergency department as needed for new or worsening symptoms including difficulty breathing, swelling to face or mouth, vomiting unable to keep anything down, or any other concerning symptoms.

## 2024-08-07 NOTE — ED PROVIDER NOTES
Emergency Department Note      History of Present Illness     Chief Complaint   Rash      HPI   Colleen Espino is a 24 year old female with a history of HIV here for evaluation of a rash. Patient reports rash over entire body. Patient reports onset of an itchy rash a few weeks ago. Initially the rash broke out on her thighs, and has spread to both arms, chest and neck. States that the itching has prevented her from getting adequate sleep. She went to urgent care on 7/28 and prescribed benadryl, zyrtec and a nine day course of prednisone. She also tried applying Aquaphor. These did not help her rash so was seen in the ER 8/2, had blood work done and had atarax administered, this did not help. She was also referred to a dermatologist however there are no openings in the near future. She was prescribed Atarax and Kenalog cream. She has not picked these up yet due to insurance issue but will pick them up today. She also has an infectious disease doctor, however he is booked out. She has not tried any topical benadryl creams. Notes that she used to take antiretroviral medications years ago due to HIV infection, and just started taking this again last week in hopes of helping her rash. Of note she recently started birth control. Denies fevers, chills, diarrhea, nasuea, urination changes.      Independent Historian   None    Review of External Notes   Urgent care office visit on 7/28/2024 for similar symptoms     Past Medical History     Medical History and Problem List   HIV  Tuberculosis       Medications   Albuterol   Atarax   Vistaril     Surgical History   D&C      Physical Exam     Patient Vitals for the past 24 hrs:   BP Temp Temp src Pulse Resp SpO2 Weight   08/07/24 1524 -- -- -- -- -- 100 % --   08/07/24 1517 (!) 125/90 97.9  F (36.6  C) Tympanic 91 18 -- 60.2 kg (132 lb 11.5 oz)     Physical Exam  Constitutional: Well developed, nontox appearance  Head: Atraumatic.   Mouth/Throat: Oropharynx is clear and moist.    Neck:  no stridor  Eyes: no scleral icterus  Cardiovascular: RRR, 2+ bilat radial pulses  Pulmonary/Chest: nml resp effort  Ext: Warm, well perfused, no edema  Neurological: A&O, symmetric facies, moves ext x4  Skin: Skin is warm and dry.  Fine papular erythematous rash to arms abdomen and chest  Psychiatric: Behavior is normal. Thought content normal.   Nursing note and vitals reviewed.    Diagnostics     Lab Results   Labs Ordered and Resulted from Time of ED Arrival to Time of ED Departure - No data to display    Imaging   No orders to display          Independent Interpretation   None    ED Course      Medications Administered   Medications - No data to display    Procedures   Procedures     Discussion of Management   None    ED Course   ED Course as of 08/07/24 1553   Wed Aug 07, 2024   1526 I obtained history and examined the patient as noted above.        Additional Documentation  None    Medical Decision Making / Diagnosis     CMS Diagnoses: None    MIPS       None    MDM   24-year-old female presenting with rash    Patient presenting with pruritic rash for which she was seen in the emergency department on 8/2/2024.  Differential diagnosis includes eczema, HIV associated  eosinophilic folliculitis/rash, dermatitis, allergic reaction.  Doubt tickborne illness given history and physical exam.  Patient reports that she recently started antiretrovirals which will likely improve her rash.  She is encouraged to take medications as previously prescribed but advised not to mix antihistamines.  Prescriptions provided as noted below for itraconazle and doxepin cream for further symptom control. At this time I feel the pt is safe for discharge.  Recommendations given regarding follow up with PCP, ID, dermatology and return to the emergency department as needed for new or worsening symptoms.  Pt counseled on all results, disposition and diagnosis.  They are understanding and agreeable to plan. Patient discharged in  stable condition.      Disposition   The patient was discharged.     Diagnosis     ICD-10-CM    1. Rash  R21       2. Pruritic rash  L28.2            Discharge Medications   Discharge Medication List as of 8/7/2024  3:46 PM        START taking these medications    Details   doxepin (ZONALON) 5 % external cream Apply topically 4 times daily for 30 daysDisp-45 g, [id number]      itraconazole (SPORANOX) 100 MG capsule Take 2 capsules (200 mg) by mouth daily for 14 days, Disp-28 capsule, R-0, E-Prescribe               Scribe Disclosure:  I, Tayler Sethi, am serving as a scribe at 3:27 PM on 8/7/2024 to document services personally performed by Antonio Wells MD based on my observations and the provider's statements to me.        Antonio Wells MD  08/07/24 3801

## 2024-08-07 NOTE — TELEPHONE ENCOUNTER
This encounter is being sent to inform the clinic that this patient has a referral from ALEYDA MURRY for the diagnoses of L29.9 (ICD-10-CM) - Itching and has requested that this patient be seen within 3-5 days and/or with [facility].  Based on the availability of our provider(s), we are unable to accommodate this request.    Were all sites offered this patient?  Yes    Does scheduling algorithm request to schedule next available?  Patient appointment has not been scheduled.  Please review the referral request for accommodation and contact the patient.  If unable to accommodate, please resubmit a referral and indicate a preferred partner or affiliate location using Provider Finder or Scheduling Instructions field.

## 2024-08-07 NOTE — TELEPHONE ENCOUNTER
Called and spoke with pt, advised we have nothing soon at . Advised pt can call insurance to see what other providers in the area are covered. Pt asking if any other [facility] locations have any openings. Routing to other locations to see if any place to get pt in sooner.    Thank you,  Kayy DIANE RN  Dermatology   [phone]

## 2024-08-07 NOTE — ED TRIAGE NOTES
Pt c/o rash and itching over entire body. Was seen on Aug 2nd here for same issue. Discharged w referral to dermatologist, and hydroxyzine and triamcinolone script. Pt has not seen dermatologist and has not picked up meds d/t insurance issue. Pt taken benadryl and Claritin today w no relief. Denies Cp/SOB/edema. VSS      Triage Assessment (Adult)       Row Name 08/07/24 1460          Triage Assessment    Airway WDL WDL        Respiratory WDL    Respiratory WDL WDL        Skin Circulation/Temperature WDL    Skin Circulation/Temperature WDL X  rash        Cardiac WDL    Cardiac WDL WDL                     
normal...

## 2024-12-24 ENCOUNTER — HOSPITAL ENCOUNTER (EMERGENCY)
Facility: CLINIC | Age: 24
Discharge: HOME | End: 2024-12-24
Attending: EMERGENCY MEDICINE
Payer: COMMERCIAL

## 2024-12-24 VITALS
WEIGHT: 119 LBS | HEART RATE: 93 BPM | HEIGHT: 67 IN | SYSTOLIC BLOOD PRESSURE: 112 MMHG | OXYGEN SATURATION: 100 % | TEMPERATURE: 98.7 F | DIASTOLIC BLOOD PRESSURE: 74 MMHG | BODY MASS INDEX: 18.68 KG/M2 | RESPIRATION RATE: 18 BRPM

## 2024-12-24 DIAGNOSIS — A08.4 VIRAL GASTROENTERITIS: ICD-10-CM

## 2024-12-24 LAB
ALBUMIN SERPL BCG-MCNC: 4 G/DL (ref 3.5–5.2)
ALP SERPL-CCNC: 71 U/L (ref 40–150)
ALT SERPL W P-5'-P-CCNC: 15 U/L (ref 0–50)
ANION GAP SERPL CALCULATED.3IONS-SCNC: 11 MMOL/L (ref 7–15)
AST SERPL W P-5'-P-CCNC: 31 U/L (ref 0–45)
BASOPHILS # BLD AUTO: 0 10E3/UL (ref 0–0.2)
BASOPHILS NFR BLD AUTO: 0 %
BILIRUB SERPL-MCNC: 0.7 MG/DL
BUN SERPL-MCNC: 5.5 MG/DL (ref 6–20)
CALCIUM SERPL-MCNC: 9.1 MG/DL (ref 8.8–10.4)
CHLORIDE SERPL-SCNC: 105 MMOL/L (ref 98–107)
CREAT SERPL-MCNC: 0.6 MG/DL (ref 0.51–0.95)
EGFRCR SERPLBLD CKD-EPI 2021: >90 ML/MIN/1.73M2
EOSINOPHIL # BLD AUTO: 0.1 10E3/UL (ref 0–0.7)
EOSINOPHIL NFR BLD AUTO: 1 %
ERYTHROCYTE [DISTWIDTH] IN BLOOD BY AUTOMATED COUNT: 17.2 % (ref 10–15)
GLUCOSE SERPL-MCNC: 100 MG/DL (ref 70–99)
HCG SERPL QL: NEGATIVE
HCO3 SERPL-SCNC: 23 MMOL/L (ref 22–29)
HCT VFR BLD AUTO: 42.4 % (ref 35–47)
HGB BLD-MCNC: 13.9 G/DL (ref 11.7–15.7)
IMM GRANULOCYTES # BLD: 0 10E3/UL
IMM GRANULOCYTES NFR BLD: 0 %
LIPASE SERPL-CCNC: 49 U/L (ref 13–60)
LYMPHOCYTES # BLD AUTO: 0.4 10E3/UL (ref 0.8–5.3)
LYMPHOCYTES NFR BLD AUTO: 4 %
MCH RBC QN AUTO: 34.1 PG (ref 26.5–33)
MCHC RBC AUTO-ENTMCNC: 32.8 G/DL (ref 31.5–36.5)
MCV RBC AUTO: 104 FL (ref 78–100)
MONOCYTES # BLD AUTO: 0.3 10E3/UL (ref 0–1.3)
MONOCYTES NFR BLD AUTO: 3 %
NEUTROPHILS # BLD AUTO: 9.4 10E3/UL (ref 1.6–8.3)
NEUTROPHILS NFR BLD AUTO: 93 %
NRBC # BLD AUTO: 0 10E3/UL
NRBC BLD AUTO-RTO: 0 /100
PLATELET # BLD AUTO: 365 10E3/UL (ref 150–450)
POTASSIUM SERPL-SCNC: 4.6 MMOL/L (ref 3.4–5.3)
PROT SERPL-MCNC: 8.1 G/DL (ref 6.4–8.3)
RBC # BLD AUTO: 4.08 10E6/UL (ref 3.8–5.2)
SODIUM SERPL-SCNC: 139 MMOL/L (ref 135–145)
WBC # BLD AUTO: 10.2 10E3/UL (ref 4–11)

## 2024-12-24 PROCEDURE — 84703 CHORIONIC GONADOTROPIN ASSAY: CPT | Performed by: EMERGENCY MEDICINE

## 2024-12-24 PROCEDURE — 82565 ASSAY OF CREATININE: CPT | Performed by: EMERGENCY MEDICINE

## 2024-12-24 PROCEDURE — 85004 AUTOMATED DIFF WBC COUNT: CPT | Performed by: EMERGENCY MEDICINE

## 2024-12-24 PROCEDURE — 99284 EMERGENCY DEPT VISIT MOD MDM: CPT | Mod: 25 | Performed by: EMERGENCY MEDICINE

## 2024-12-24 PROCEDURE — 258N000003 HC RX IP 258 OP 636: Performed by: EMERGENCY MEDICINE

## 2024-12-24 PROCEDURE — 80053 COMPREHEN METABOLIC PANEL: CPT | Performed by: EMERGENCY MEDICINE

## 2024-12-24 PROCEDURE — 96360 HYDRATION IV INFUSION INIT: CPT | Performed by: EMERGENCY MEDICINE

## 2024-12-24 PROCEDURE — 36415 COLL VENOUS BLD VENIPUNCTURE: CPT | Performed by: EMERGENCY MEDICINE

## 2024-12-24 PROCEDURE — 83690 ASSAY OF LIPASE: CPT | Performed by: EMERGENCY MEDICINE

## 2024-12-24 PROCEDURE — 85048 AUTOMATED LEUKOCYTE COUNT: CPT | Performed by: EMERGENCY MEDICINE

## 2024-12-24 PROCEDURE — 250N000013 HC RX MED GY IP 250 OP 250 PS 637: Performed by: EMERGENCY MEDICINE

## 2024-12-24 PROCEDURE — 250N000011 HC RX IP 250 OP 636: Performed by: EMERGENCY MEDICINE

## 2024-12-24 PROCEDURE — 84295 ASSAY OF SERUM SODIUM: CPT | Performed by: EMERGENCY MEDICINE

## 2024-12-24 RX ORDER — ONDANSETRON 4 MG/1
4 TABLET, ORALLY DISINTEGRATING ORAL ONCE
Status: COMPLETED | OUTPATIENT
Start: 2024-12-24 | End: 2024-12-24

## 2024-12-24 RX ORDER — ONDANSETRON 4 MG/1
4 TABLET, ORALLY DISINTEGRATING ORAL EVERY 8 HOURS PRN
Qty: 15 TABLET | Refills: 0 | Status: SHIPPED | OUTPATIENT
Start: 2024-12-24

## 2024-12-24 RX ADMIN — Medication 30 ML: at 13:33

## 2024-12-24 RX ADMIN — ONDANSETRON 4 MG: 4 TABLET, ORALLY DISINTEGRATING ORAL at 11:54

## 2024-12-24 RX ADMIN — SODIUM CHLORIDE 1000 ML: 9 INJECTION, SOLUTION INTRAVENOUS at 13:12

## 2024-12-24 ASSESSMENT — COLUMBIA-SUICIDE SEVERITY RATING SCALE - C-SSRS
1. IN THE PAST MONTH, HAVE YOU WISHED YOU WERE DEAD OR WISHED YOU COULD GO TO SLEEP AND NOT WAKE UP?: NO
2. HAVE YOU ACTUALLY HAD ANY THOUGHTS OF KILLING YOURSELF IN THE PAST MONTH?: NO
6. HAVE YOU EVER DONE ANYTHING, STARTED TO DO ANYTHING, OR PREPARED TO DO ANYTHING TO END YOUR LIFE?: NO

## 2024-12-24 ASSESSMENT — ACTIVITIES OF DAILY LIVING (ADL)
ADLS_ACUITY_SCORE: 41

## 2024-12-24 NOTE — ED TRIAGE NOTES
Diarrhea, nausea and vomiting since last night. Pt also c/o toes numbness for the past 2 weeks.      Triage Assessment (Adult)       Row Name 12/24/24 1148          Triage Assessment    Airway WDL WDL        Respiratory WDL    Respiratory WDL WDL        Skin Circulation/Temperature WDL    Skin Circulation/Temperature WDL WDL        Cardiac WDL    Cardiac WDL WDL        Peripheral/Neurovascular WDL    Peripheral Neurovascular WDL WDL        Cognitive/Neuro/Behavioral WDL    Cognitive/Neuro/Behavioral WDL WDL

## 2024-12-24 NOTE — LETTER
December 24, 2024      To Whom It May Concern:      Colleen Espino was seen in our Emergency Department today, 12/24/24.  I expect her condition to improve over the next 2-3 days.  She may return to work/school when improved.    Sincerely,        Mateo Grullon MD

## 2024-12-24 NOTE — ED PROVIDER NOTES
"  Emergency Department Note      History of Present Illness     Chief Complaint   Nausea, Vomiting, & Diarrhea      HPI   Colleen Espino is a 24 year old female with history of HIV who presents to the ED for evaluation of nausea, vomiting, and diarrhea. Colleen reports she awoke at 0200 with these symptoms which have persisted since. She cannot tolerate anything PO. She endorses mild abdominal cramping. No focal or severe abdominal pain. Denies hematochezia, hematemesis, or fever. She works as a caregiver in a facility and has been around people with similar symptoms. Patient also mentions ongoing neuropathy in her bilateral toes now radiating up to her ankles which is chronic and unchanged. She denies any other symptoms.    Independent Historian   None    Review of External Notes   None    Past Medical History     Medical History and Problem List   HIV  TB  Vitamin D deficiency    Medications   Tivicay  Odefsey  Nexplanon    Surgical History   D&C    Physical Exam     Patient Vitals for the past 24 hrs:   BP Temp Temp src Pulse Resp SpO2 Height Weight   12/24/24 1445 112/74 -- -- 93 18 100 % -- --   12/24/24 1147 (!) 145/88 98.7  F (37.1  C) Temporal (!) 123 20 100 % 1.702 m (5' 7\") 54 kg (119 lb)     Physical Exam  General: Well appearing, nontoxic. Resting comfortably  Head:  Scalp, face, and head appear normal  Eyes:  Pupils are equal, round    Conjunctivae non-injected and sclerae white  ENT:    The external nose is normal. The oropharynx is normal, mucous membranes moist. Uvula is in the midline. Posterior pharynx without erythema, swelling, exudates.      Pinnae are normal  Neck:  Normal range of motion    There is no rigidity noted    Trachea is in the midline  CV:  Regular rate and rhythm     Normal S1/S2, no S3/S4    No murmur or rub. Radial pulses 2+ bilaterally.  Resp:  Lungs are clear and equal bilaterally  There is no tachypnea    No increased work of breathing    No rales, wheezing, or " rhonchi  GI:  Abdomen is soft, no rigidity or guarding    No distension, or mass    No tenderness or rebound tenderness   MS:  Normal muscular tone    Symmetric motor strength    No lower extremity edema  Skin:  No rash or acute skin lesions noted  Neuro:  Awake and alert  Speech is normal and fluent  Moves all extremities spontaneously  Psych: Normal affect. Appropriate interactions.     Diagnostics     Lab Results   Labs Ordered and Resulted from Time of ED Arrival to Time of ED Departure   COMPREHENSIVE METABOLIC PANEL - Abnormal       Result Value    Sodium 139      Potassium 4.6      Carbon Dioxide (CO2) 23      Anion Gap 11      Urea Nitrogen 5.5 (*)     Creatinine 0.60      GFR Estimate >90      Calcium 9.1      Chloride 105      Glucose 100 (*)     Alkaline Phosphatase 71      AST 31      ALT 15      Protein Total 8.1      Albumin 4.0      Bilirubin Total 0.7     CBC WITH PLATELETS AND DIFFERENTIAL - Abnormal    WBC Count 10.2      RBC Count 4.08      Hemoglobin 13.9      Hematocrit 42.4       (*)     MCH 34.1 (*)     MCHC 32.8      RDW 17.2 (*)     Platelet Count 365      % Neutrophils 93      % Lymphocytes 4      % Monocytes 3      % Eosinophils 1      % Basophils 0      % Immature Granulocytes 0      NRBCs per 100 WBC 0      Absolute Neutrophils 9.4 (*)     Absolute Lymphocytes 0.4 (*)     Absolute Monocytes 0.3      Absolute Eosinophils 0.1      Absolute Basophils 0.0      Absolute Immature Granulocytes 0.0      Absolute NRBCs 0.0     LIPASE - Normal    Lipase 49     HCG QUALITATIVE PREGNANCY - Normal    hCG Serum Qualitative Negative         Imaging   No orders to display       Independent Interpretation   None    ED Course      Medications Administered   Medications   ondansetron (ZOFRAN ODT) ODT tab 4 mg (4 mg Oral $Given 12/24/24 1154)   sodium chloride 0.9% BOLUS 1,000 mL (0 mLs Intravenous Stopped 12/24/24 1439)   bismuth subsalicylate (PEPTO BISMOL) 262 mg/15 mL suspension 30 mL (30 mLs  Oral $Given 12/24/24 0484)       Procedures   Procedures     Discussion of Management   None    ED Course   ED Course as of 12/25/24 0901   Tue Dec 24, 2024   1250 I obtained history and examined the patient as noted above.    1718 Patient updated regarding findings and plan of care.        Additional Documentation  None    Medical Decision Making / Diagnosis     CMS Diagnoses: None    MIPS       None    MDM   Colleen Espino is a 24 year old female who presents for evaluation of nausea vomiting and diarrhea.  Patient has a history of HIV on current antiretroviral therapy and reports that her HIV is well-controlled.  On my evaluation she is well-appearing, hemodynamically stable and afebrile.  Evaluation in the emergency department consistent with uncomplicated acute gastroenteritis.  Patient was treated with IV fluids, antiemetics and Pepto-Bismol.  She should continue Zofran, Pepto-Bismol and oral rehydration at home.  Laboratory studies are reassuring.  LFTs, lipase, bilirubin are normal.  Renal function is normal.  No significant metabolic disturbance.  CBC is unremarkable.  Discussed supportive care for home, the contagious nature of her symptoms and how to prevent spread.  Follow-up with PCP as recommended.  Return precautions were provided and patient was discharged in stable and improved condition.  No indication for emergent abdominal imaging at this time.  Abdominal exam is benign and there is no evidence for an acute underlying surgical process.    Disposition   The patient was discharged.     Diagnosis     ICD-10-CM    1. Viral gastroenteritis  A08.4            Discharge Medications   Discharge Medication List as of 12/24/2024  5:28 PM        START taking these medications    Details   bismuth subsalicylate (PEPTO BISMOL) 262 MG/15ML suspension Take 30 mL by mouth every 60 minutes as needed for stomach upset, vomiting, diarrhea, indigestion. Do not take more than four doses (120 mL) in 24 hours., Disp-354  mL, R-0, E-Prescribe      ondansetron (ZOFRAN ODT) 4 MG ODT tab Take 1 tablet (4 mg) by mouth every 8 hours as needed for nausea or vomiting., Disp-15 tablet, R-0, E-PrescribeMay substitute non-ODT formulation per patient preference/insurance coverage.               Scribe Disclosure:  I, Anel Womack, am serving as a scribe at 1:16 PM on 12/24/2024 to document services personally performed by Mateo Grullon MD based on my observations and the provider's statements to me.        Mateo Grullon MD  12/25/24 3699

## 2025-04-01 ENCOUNTER — APPOINTMENT (OUTPATIENT)
Dept: GENERAL RADIOLOGY | Facility: CLINIC | Age: 25
End: 2025-04-01
Attending: EMERGENCY MEDICINE
Payer: COMMERCIAL

## 2025-04-01 ENCOUNTER — HOSPITAL ENCOUNTER (OUTPATIENT)
Facility: CLINIC | Age: 25
Setting detail: OBSERVATION
Discharge: HOME | End: 2025-04-03
Attending: EMERGENCY MEDICINE | Admitting: INTERNAL MEDICINE
Payer: COMMERCIAL

## 2025-04-01 DIAGNOSIS — R74.01 TRANSAMINITIS: ICD-10-CM

## 2025-04-01 DIAGNOSIS — B20 HUMAN IMMUNODEFICIENCY VIRUS (HIV) DISEASE (H): ICD-10-CM

## 2025-04-01 DIAGNOSIS — N39.0 URINARY TRACT INFECTION WITHOUT HEMATURIA, SITE UNSPECIFIED: Primary | ICD-10-CM

## 2025-04-01 DIAGNOSIS — A04.72 C. DIFFICILE COLITIS: ICD-10-CM

## 2025-04-01 DIAGNOSIS — G62.9 NEUROPATHY: ICD-10-CM

## 2025-04-01 LAB
ALBUMIN SERPL BCG-MCNC: 4.6 G/DL (ref 3.5–5.2)
ALBUMIN UR-MCNC: 200 MG/DL
ALP SERPL-CCNC: 166 U/L (ref 40–150)
ALT SERPL W P-5'-P-CCNC: 30 U/L (ref 0–50)
ANION GAP SERPL CALCULATED.3IONS-SCNC: 14 MMOL/L (ref 7–15)
APPEARANCE UR: ABNORMAL
AST SERPL W P-5'-P-CCNC: 58 U/L (ref 0–45)
BASOPHILS # BLD AUTO: 0 10E3/UL (ref 0–0.2)
BASOPHILS NFR BLD AUTO: 0 %
BILIRUB SERPL-MCNC: 1.6 MG/DL
BILIRUB UR QL STRIP: ABNORMAL
BUN SERPL-MCNC: 10 MG/DL (ref 6–20)
CALCIUM SERPL-MCNC: 9.8 MG/DL (ref 8.8–10.4)
CHLORIDE SERPL-SCNC: 93 MMOL/L (ref 98–107)
COLOR UR AUTO: ABNORMAL
CREAT SERPL-MCNC: 0.51 MG/DL (ref 0.51–0.95)
EGFRCR SERPLBLD CKD-EPI 2021: >90 ML/MIN/1.73M2
EOSINOPHIL # BLD AUTO: 0 10E3/UL (ref 0–0.7)
EOSINOPHIL NFR BLD AUTO: 0 %
ERYTHROCYTE [DISTWIDTH] IN BLOOD BY AUTOMATED COUNT: 13.8 % (ref 10–15)
ETHANOL SERPL-MCNC: <0.01 G/DL
FLUAV RNA SPEC QL NAA+PROBE: NEGATIVE
FLUBV RNA RESP QL NAA+PROBE: NEGATIVE
GLUCOSE BLDC GLUCOMTR-MCNC: 127 MG/DL (ref 70–99)
GLUCOSE SERPL-MCNC: 114 MG/DL (ref 70–99)
GLUCOSE UR STRIP-MCNC: NEGATIVE MG/DL
HCO3 BLDV-SCNC: 31 MMOL/L (ref 21–28)
HCO3 SERPL-SCNC: 27 MMOL/L (ref 22–29)
HCT VFR BLD AUTO: 41.8 % (ref 35–47)
HGB BLD-MCNC: 14.1 G/DL (ref 11.7–15.7)
HGB UR QL STRIP: ABNORMAL
IMM GRANULOCYTES # BLD: 0 10E3/UL
IMM GRANULOCYTES NFR BLD: 0 %
KETONES UR STRIP-MCNC: 10 MG/DL
LACTATE BLD-SCNC: 1.4 MMOL/L (ref 0.7–2)
LEUKOCYTE ESTERASE UR QL STRIP: ABNORMAL
LYMPHOCYTES # BLD AUTO: 1.4 10E3/UL (ref 0.8–5.3)
LYMPHOCYTES NFR BLD AUTO: 14 %
MAGNESIUM SERPL-MCNC: 1.7 MG/DL (ref 1.7–2.3)
MCH RBC QN AUTO: 32.4 PG (ref 26.5–33)
MCHC RBC AUTO-ENTMCNC: 33.7 G/DL (ref 31.5–36.5)
MCV RBC AUTO: 96 FL (ref 78–100)
MONOCYTES # BLD AUTO: 0.6 10E3/UL (ref 0–1.3)
MONOCYTES NFR BLD AUTO: 6 %
MUCOUS THREADS #/AREA URNS LPF: PRESENT /LPF
NEUTROPHILS # BLD AUTO: 8.2 10E3/UL (ref 1.6–8.3)
NEUTROPHILS NFR BLD AUTO: 80 %
NITRATE UR QL: NEGATIVE
NRBC # BLD AUTO: 0 10E3/UL
NRBC BLD AUTO-RTO: 0 /100
PCO2 BLDV: 45 MM HG (ref 40–50)
PH BLDV: 7.45 [PH] (ref 7.32–7.43)
PH UR STRIP: 8 [PH] (ref 5–7)
PLATELET # BLD AUTO: 246 10E3/UL (ref 150–450)
PO2 BLDV: 28 MM HG (ref 25–47)
POTASSIUM SERPL-SCNC: 3.6 MMOL/L (ref 3.4–5.3)
PROT SERPL-MCNC: 9.7 G/DL (ref 6.4–8.3)
RBC # BLD AUTO: 4.35 10E6/UL (ref 3.8–5.2)
RBC URINE: 11 /HPF
RSV RNA SPEC NAA+PROBE: NEGATIVE
SAO2 % BLDV: 55 % (ref 70–75)
SARS-COV-2 RNA RESP QL NAA+PROBE: NEGATIVE
SODIUM SERPL-SCNC: 134 MMOL/L (ref 135–145)
SP GR UR STRIP: 1.01 (ref 1–1.03)
SQUAMOUS EPITHELIAL: 2 /HPF
UROBILINOGEN UR STRIP-MCNC: 12 MG/DL
WBC # BLD AUTO: 10.2 10E3/UL (ref 4–11)
WBC URINE: 110 /HPF

## 2025-04-01 PROCEDURE — 87040 BLOOD CULTURE FOR BACTERIA: CPT | Performed by: EMERGENCY MEDICINE

## 2025-04-01 PROCEDURE — 83690 ASSAY OF LIPASE: CPT | Performed by: INTERNAL MEDICINE

## 2025-04-01 PROCEDURE — 87088 URINE BACTERIA CULTURE: CPT | Performed by: EMERGENCY MEDICINE

## 2025-04-01 PROCEDURE — 96374 THER/PROPH/DIAG INJ IV PUSH: CPT

## 2025-04-01 PROCEDURE — 71046 X-RAY EXAM CHEST 2 VIEWS: CPT

## 2025-04-01 PROCEDURE — 84132 ASSAY OF SERUM POTASSIUM: CPT | Performed by: EMERGENCY MEDICINE

## 2025-04-01 PROCEDURE — 96375 TX/PRO/DX INJ NEW DRUG ADDON: CPT

## 2025-04-01 PROCEDURE — 83735 ASSAY OF MAGNESIUM: CPT | Performed by: EMERGENCY MEDICINE

## 2025-04-01 PROCEDURE — 82248 BILIRUBIN DIRECT: CPT | Performed by: INTERNAL MEDICINE

## 2025-04-01 PROCEDURE — 85014 HEMATOCRIT: CPT | Performed by: EMERGENCY MEDICINE

## 2025-04-01 PROCEDURE — 96361 HYDRATE IV INFUSION ADD-ON: CPT

## 2025-04-01 PROCEDURE — 85025 COMPLETE CBC W/AUTO DIFF WBC: CPT | Performed by: EMERGENCY MEDICINE

## 2025-04-01 PROCEDURE — 99291 CRITICAL CARE FIRST HOUR: CPT | Mod: 25

## 2025-04-01 PROCEDURE — 84100 ASSAY OF PHOSPHORUS: CPT | Performed by: INTERNAL MEDICINE

## 2025-04-01 PROCEDURE — 36415 COLL VENOUS BLD VENIPUNCTURE: CPT | Performed by: EMERGENCY MEDICINE

## 2025-04-01 PROCEDURE — 250N000011 HC RX IP 250 OP 636: Performed by: EMERGENCY MEDICINE

## 2025-04-01 PROCEDURE — 82947 ASSAY GLUCOSE BLOOD QUANT: CPT | Performed by: EMERGENCY MEDICINE

## 2025-04-01 PROCEDURE — 81001 URINALYSIS AUTO W/SCOPE: CPT | Performed by: EMERGENCY MEDICINE

## 2025-04-01 PROCEDURE — 84703 CHORIONIC GONADOTROPIN ASSAY: CPT | Performed by: EMERGENCY MEDICINE

## 2025-04-01 PROCEDURE — 83605 ASSAY OF LACTIC ACID: CPT

## 2025-04-01 PROCEDURE — 82962 GLUCOSE BLOOD TEST: CPT

## 2025-04-01 PROCEDURE — 82077 ASSAY SPEC XCP UR&BREATH IA: CPT | Performed by: EMERGENCY MEDICINE

## 2025-04-01 PROCEDURE — 87637 SARSCOV2&INF A&B&RSV AMP PRB: CPT | Performed by: EMERGENCY MEDICINE

## 2025-04-01 PROCEDURE — 258N000003 HC RX IP 258 OP 636: Performed by: EMERGENCY MEDICINE

## 2025-04-01 PROCEDURE — 82803 BLOOD GASES ANY COMBINATION: CPT

## 2025-04-01 RX ORDER — METOCLOPRAMIDE HYDROCHLORIDE 5 MG/ML
10 INJECTION INTRAMUSCULAR; INTRAVENOUS ONCE
Status: COMPLETED | OUTPATIENT
Start: 2025-04-01 | End: 2025-04-01

## 2025-04-01 RX ORDER — DIPHENHYDRAMINE HYDROCHLORIDE 50 MG/ML
25 INJECTION, SOLUTION INTRAMUSCULAR; INTRAVENOUS ONCE
Status: COMPLETED | OUTPATIENT
Start: 2025-04-01 | End: 2025-04-01

## 2025-04-01 RX ORDER — ONDANSETRON 4 MG/1
4 TABLET, ORALLY DISINTEGRATING ORAL ONCE
Status: COMPLETED | OUTPATIENT
Start: 2025-04-01 | End: 2025-04-01

## 2025-04-01 RX ORDER — CEFTRIAXONE 2 G/1
2 INJECTION, POWDER, FOR SOLUTION INTRAMUSCULAR; INTRAVENOUS ONCE
Status: DISCONTINUED | OUTPATIENT
Start: 2025-04-02 | End: 2025-04-02

## 2025-04-01 RX ADMIN — ONDANSETRON 4 MG: 4 TABLET, ORALLY DISINTEGRATING ORAL at 21:14

## 2025-04-01 RX ADMIN — DIPHENHYDRAMINE HYDROCHLORIDE 25 MG: 50 INJECTION, SOLUTION INTRAMUSCULAR; INTRAVENOUS at 23:37

## 2025-04-01 RX ADMIN — METOCLOPRAMIDE 10 MG: 5 INJECTION, SOLUTION INTRAMUSCULAR; INTRAVENOUS at 23:37

## 2025-04-01 RX ADMIN — SODIUM CHLORIDE, SODIUM LACTATE, POTASSIUM CHLORIDE, AND CALCIUM CHLORIDE 1000 ML: .6; .31; .03; .02 INJECTION, SOLUTION INTRAVENOUS at 23:36

## 2025-04-01 ASSESSMENT — ACTIVITIES OF DAILY LIVING (ADL)
ADLS_ACUITY_SCORE: 41
ADLS_ACUITY_SCORE: 41

## 2025-04-02 ENCOUNTER — APPOINTMENT (OUTPATIENT)
Dept: ULTRASOUND IMAGING | Facility: CLINIC | Age: 25
End: 2025-04-02
Attending: EMERGENCY MEDICINE
Payer: COMMERCIAL

## 2025-04-02 PROBLEM — B20 HUMAN IMMUNODEFICIENCY VIRUS (HIV) DISEASE (H): Status: ACTIVE | Noted: 2025-04-02

## 2025-04-02 PROBLEM — N39.0 URINARY TRACT INFECTION WITHOUT HEMATURIA, SITE UNSPECIFIED: Status: ACTIVE | Noted: 2025-04-02

## 2025-04-02 PROBLEM — R74.01 TRANSAMINITIS: Status: ACTIVE | Noted: 2025-04-02

## 2025-04-02 LAB
ALBUMIN SERPL BCG-MCNC: 3.5 G/DL (ref 3.5–5.2)
ALP SERPL-CCNC: 119 U/L (ref 40–150)
ALT SERPL W P-5'-P-CCNC: 23 U/L (ref 0–50)
ANION GAP SERPL CALCULATED.3IONS-SCNC: 9 MMOL/L (ref 7–15)
AST SERPL W P-5'-P-CCNC: 45 U/L (ref 0–45)
BILIRUB DIRECT SERPL-MCNC: 0.46 MG/DL (ref 0–0.3)
BILIRUB DIRECT SERPL-MCNC: 0.54 MG/DL (ref 0–0.3)
BILIRUB SERPL-MCNC: 1.4 MG/DL
BUN SERPL-MCNC: 6.9 MG/DL (ref 6–20)
C DIFF TOX B STL QL: POSITIVE
CALCIUM SERPL-MCNC: 8.6 MG/DL (ref 8.8–10.4)
CHLORIDE SERPL-SCNC: 101 MMOL/L (ref 98–107)
CREAT SERPL-MCNC: 0.51 MG/DL (ref 0.51–0.95)
EGFRCR SERPLBLD CKD-EPI 2021: >90 ML/MIN/1.73M2
GLUCOSE SERPL-MCNC: 86 MG/DL (ref 70–99)
HCG SERPL QL: NEGATIVE
HCO3 SERPL-SCNC: 27 MMOL/L (ref 22–29)
HOLD SPECIMEN: NORMAL
LABORATORY COMMENT REPORT: ABNORMAL
LIPASE SERPL-CCNC: 79 U/L (ref 13–60)
MAGNESIUM SERPL-MCNC: 1.5 MG/DL (ref 1.7–2.3)
MAGNESIUM SERPL-MCNC: 2.3 MG/DL (ref 1.7–2.3)
PHOSPHATE SERPL-MCNC: 1.6 MG/DL (ref 2.5–4.5)
PHOSPHATE SERPL-MCNC: 4.2 MG/DL (ref 2.5–4.5)
POTASSIUM SERPL-SCNC: 3.7 MMOL/L (ref 3.4–5.3)
PROT SERPL-MCNC: 7.4 G/DL (ref 6.4–8.3)
SODIUM SERPL-SCNC: 137 MMOL/L (ref 135–145)

## 2025-04-02 PROCEDURE — 36415 COLL VENOUS BLD VENIPUNCTURE: CPT | Performed by: INTERNAL MEDICINE

## 2025-04-02 PROCEDURE — 99222 1ST HOSP IP/OBS MODERATE 55: CPT | Performed by: INTERNAL MEDICINE

## 2025-04-02 PROCEDURE — 258N000003 HC RX IP 258 OP 636: Performed by: EMERGENCY MEDICINE

## 2025-04-02 PROCEDURE — 83735 ASSAY OF MAGNESIUM: CPT | Performed by: INTERNAL MEDICINE

## 2025-04-02 PROCEDURE — 250N000013 HC RX MED GY IP 250 OP 250 PS 637: Performed by: INTERNAL MEDICINE

## 2025-04-02 PROCEDURE — 76705 ECHO EXAM OF ABDOMEN: CPT

## 2025-04-02 PROCEDURE — 87493 C DIFF AMPLIFIED PROBE: CPT | Performed by: HOSPITALIST

## 2025-04-02 PROCEDURE — 258N000003 HC RX IP 258 OP 636: Performed by: INTERNAL MEDICINE

## 2025-04-02 PROCEDURE — G0378 HOSPITAL OBSERVATION PER HR: HCPCS

## 2025-04-02 PROCEDURE — 96375 TX/PRO/DX INJ NEW DRUG ADDON: CPT

## 2025-04-02 PROCEDURE — 87324 CLOSTRIDIUM AG IA: CPT | Performed by: HOSPITALIST

## 2025-04-02 PROCEDURE — 82310 ASSAY OF CALCIUM: CPT | Performed by: INTERNAL MEDICINE

## 2025-04-02 PROCEDURE — 250N000011 HC RX IP 250 OP 636: Performed by: INTERNAL MEDICINE

## 2025-04-02 PROCEDURE — 82248 BILIRUBIN DIRECT: CPT | Performed by: INTERNAL MEDICINE

## 2025-04-02 PROCEDURE — 36415 COLL VENOUS BLD VENIPUNCTURE: CPT | Performed by: HOSPITALIST

## 2025-04-02 PROCEDURE — 83735 ASSAY OF MAGNESIUM: CPT | Performed by: HOSPITALIST

## 2025-04-02 PROCEDURE — 250N000011 HC RX IP 250 OP 636: Performed by: HOSPITALIST

## 2025-04-02 PROCEDURE — 87507 IADNA-DNA/RNA PROBE TQ 12-25: CPT | Performed by: HOSPITALIST

## 2025-04-02 PROCEDURE — 80048 BASIC METABOLIC PNL TOTAL CA: CPT | Performed by: INTERNAL MEDICINE

## 2025-04-02 PROCEDURE — 96361 HYDRATE IV INFUSION ADD-ON: CPT

## 2025-04-02 PROCEDURE — 250N000013 HC RX MED GY IP 250 OP 250 PS 637: Performed by: EMERGENCY MEDICINE

## 2025-04-02 PROCEDURE — 250N000013 HC RX MED GY IP 250 OP 250 PS 637: Performed by: NURSE PRACTITIONER

## 2025-04-02 PROCEDURE — 84100 ASSAY OF PHOSPHORUS: CPT | Performed by: HOSPITALIST

## 2025-04-02 PROCEDURE — 250N000013 HC RX MED GY IP 250 OP 250 PS 637: Performed by: HOSPITALIST

## 2025-04-02 RX ORDER — TRAZODONE HYDROCHLORIDE 50 MG/1
50 TABLET ORAL AT BEDTIME
Status: DISCONTINUED | OUTPATIENT
Start: 2025-04-02 | End: 2025-04-03 | Stop reason: HOSPADM

## 2025-04-02 RX ORDER — FLUMAZENIL 0.1 MG/ML
0.2 INJECTION, SOLUTION INTRAVENOUS
Status: DISCONTINUED | OUTPATIENT
Start: 2025-04-02 | End: 2025-04-02

## 2025-04-02 RX ORDER — FOLIC ACID 1 MG/1
1 TABLET ORAL DAILY
Status: DISCONTINUED | OUTPATIENT
Start: 2025-04-02 | End: 2025-04-03 | Stop reason: HOSPADM

## 2025-04-02 RX ORDER — MULTIPLE VITAMINS W/ MINERALS TAB 9MG-400MCG
1 TAB ORAL EVERY EVENING
Status: DISCONTINUED | OUTPATIENT
Start: 2025-04-03 | End: 2025-04-02

## 2025-04-02 RX ORDER — HYDROXYZINE HYDROCHLORIDE 25 MG/1
25 TABLET, FILM COATED ORAL 3 TIMES DAILY PRN
Status: DISCONTINUED | OUTPATIENT
Start: 2025-04-02 | End: 2025-04-02

## 2025-04-02 RX ORDER — MULTIPLE VITAMINS W/ MINERALS TAB 9MG-400MCG
1 TAB ORAL DAILY
Status: DISCONTINUED | OUTPATIENT
Start: 2025-04-02 | End: 2025-04-02

## 2025-04-02 RX ORDER — PROCHLORPERAZINE MALEATE 5 MG/1
10 TABLET ORAL EVERY 6 HOURS PRN
Status: DISCONTINUED | OUTPATIENT
Start: 2025-04-02 | End: 2025-04-03 | Stop reason: HOSPADM

## 2025-04-02 RX ORDER — PREGABALIN 75 MG/1
75 CAPSULE ORAL 2 TIMES DAILY
Status: DISCONTINUED | OUTPATIENT
Start: 2025-04-02 | End: 2025-04-03 | Stop reason: HOSPADM

## 2025-04-02 RX ORDER — HALOPERIDOL 5 MG/ML
2.5-5 INJECTION INTRAMUSCULAR EVERY 6 HOURS PRN
Status: DISCONTINUED | OUTPATIENT
Start: 2025-04-02 | End: 2025-04-03 | Stop reason: HOSPADM

## 2025-04-02 RX ORDER — ZINC SULFATE 50(220)MG
220 CAPSULE ORAL EVERY EVENING
COMMUNITY

## 2025-04-02 RX ORDER — FOLIC ACID 1 MG/1
1 TABLET ORAL DAILY
COMMUNITY

## 2025-04-02 RX ORDER — ALBUTEROL SULFATE 90 UG/1
2 INHALANT RESPIRATORY (INHALATION) EVERY 4 HOURS PRN
Status: DISCONTINUED | OUTPATIENT
Start: 2025-04-02 | End: 2025-04-02

## 2025-04-02 RX ORDER — ONDANSETRON 4 MG/1
4 TABLET, ORALLY DISINTEGRATING ORAL EVERY 8 HOURS PRN
Status: DISCONTINUED | OUTPATIENT
Start: 2025-04-02 | End: 2025-04-02

## 2025-04-02 RX ORDER — ONDANSETRON 4 MG/1
4 TABLET, ORALLY DISINTEGRATING ORAL EVERY 6 HOURS PRN
Status: DISCONTINUED | OUTPATIENT
Start: 2025-04-02 | End: 2025-04-03 | Stop reason: HOSPADM

## 2025-04-02 RX ORDER — CHOLECALCIFEROL (VITAMIN D3) 1250 MCG
1250 CAPSULE ORAL
COMMUNITY

## 2025-04-02 RX ORDER — AMOXICILLIN 250 MG
2 CAPSULE ORAL 2 TIMES DAILY PRN
Status: DISCONTINUED | OUTPATIENT
Start: 2025-04-02 | End: 2025-04-03 | Stop reason: HOSPADM

## 2025-04-02 RX ORDER — MAGNESIUM SULFATE HEPTAHYDRATE 40 MG/ML
2 INJECTION, SOLUTION INTRAVENOUS ONCE
Status: COMPLETED | OUTPATIENT
Start: 2025-04-02 | End: 2025-04-02

## 2025-04-02 RX ORDER — OLANZAPINE 5 MG/1
5-10 TABLET, ORALLY DISINTEGRATING ORAL EVERY 6 HOURS PRN
Status: DISCONTINUED | OUTPATIENT
Start: 2025-04-02 | End: 2025-04-03 | Stop reason: HOSPADM

## 2025-04-02 RX ORDER — DIPHENHYDRAMINE HCL 25 MG
25 CAPSULE ORAL EVERY 6 HOURS PRN
Status: DISCONTINUED | OUTPATIENT
Start: 2025-04-02 | End: 2025-04-03 | Stop reason: HOSPADM

## 2025-04-02 RX ORDER — LANOLIN ALCOHOL/MO/W.PET/CERES
100 CREAM (GRAM) TOPICAL DAILY
COMMUNITY

## 2025-04-02 RX ORDER — PRENATAL VIT/IRON FUM/FOLIC AC 27MG-0.8MG
1 TABLET ORAL EVERY EVENING
Status: DISCONTINUED | OUTPATIENT
Start: 2025-04-02 | End: 2025-04-03 | Stop reason: HOSPADM

## 2025-04-02 RX ORDER — LORAZEPAM 1 MG/1
1-2 TABLET ORAL EVERY 30 MIN PRN
Status: DISCONTINUED | OUTPATIENT
Start: 2025-04-02 | End: 2025-04-03 | Stop reason: HOSPADM

## 2025-04-02 RX ORDER — NITROFURANTOIN 25; 75 MG/1; MG/1
100 CAPSULE ORAL ONCE
Status: COMPLETED | OUTPATIENT
Start: 2025-04-02 | End: 2025-04-02

## 2025-04-02 RX ORDER — ONDANSETRON 2 MG/ML
4 INJECTION INTRAMUSCULAR; INTRAVENOUS EVERY 6 HOURS PRN
Status: DISCONTINUED | OUTPATIENT
Start: 2025-04-02 | End: 2025-04-03 | Stop reason: HOSPADM

## 2025-04-02 RX ORDER — PHENOL 1.4 %
10 AEROSOL, SPRAY (ML) MUCOUS MEMBRANE
COMMUNITY

## 2025-04-02 RX ORDER — DULOXETIN HYDROCHLORIDE 30 MG/1
30 CAPSULE, DELAYED RELEASE ORAL DAILY
Status: DISCONTINUED | OUTPATIENT
Start: 2025-04-02 | End: 2025-04-03 | Stop reason: HOSPADM

## 2025-04-02 RX ORDER — CLONIDINE HYDROCHLORIDE 0.1 MG/1
0.1 TABLET ORAL EVERY 8 HOURS
Status: DISCONTINUED | OUTPATIENT
Start: 2025-04-02 | End: 2025-04-03

## 2025-04-02 RX ORDER — ACETAMINOPHEN 500 MG
1000 TABLET ORAL PRN
COMMUNITY

## 2025-04-02 RX ORDER — FLUTICASONE PROPIONATE 50 MCG
2 SPRAY, SUSPENSION (ML) NASAL DAILY PRN
COMMUNITY

## 2025-04-02 RX ORDER — HYDROXYZINE HYDROCHLORIDE 25 MG/1
25 TABLET, FILM COATED ORAL EVERY 8 HOURS PRN
Status: DISCONTINUED | OUTPATIENT
Start: 2025-04-02 | End: 2025-04-03 | Stop reason: HOSPADM

## 2025-04-02 RX ORDER — TRAZODONE HYDROCHLORIDE 50 MG/1
50 TABLET ORAL AT BEDTIME
COMMUNITY

## 2025-04-02 RX ORDER — PREGABALIN 75 MG/1
75 CAPSULE ORAL 2 TIMES DAILY
COMMUNITY

## 2025-04-02 RX ORDER — CAPSAICIN 0.025 %
CREAM (GRAM) TOPICAL 3 TIMES DAILY PRN
COMMUNITY

## 2025-04-02 RX ORDER — HYDROXYZINE HYDROCHLORIDE 25 MG/1
25 TABLET, FILM COATED ORAL EVERY 8 HOURS PRN
COMMUNITY

## 2025-04-02 RX ORDER — IBUPROFEN 200 MG
400 TABLET ORAL PRN
COMMUNITY

## 2025-04-02 RX ORDER — AMOXICILLIN 250 MG
1 CAPSULE ORAL 2 TIMES DAILY PRN
Status: DISCONTINUED | OUTPATIENT
Start: 2025-04-02 | End: 2025-04-03 | Stop reason: HOSPADM

## 2025-04-02 RX ORDER — DULOXETIN HYDROCHLORIDE 30 MG/1
30 CAPSULE, DELAYED RELEASE ORAL DAILY
COMMUNITY

## 2025-04-02 RX ORDER — LORAZEPAM 2 MG/ML
1-2 INJECTION INTRAMUSCULAR EVERY 30 MIN PRN
Status: DISCONTINUED | OUTPATIENT
Start: 2025-04-02 | End: 2025-04-03 | Stop reason: HOSPADM

## 2025-04-02 RX ORDER — SODIUM CHLORIDE 9 MG/ML
INJECTION, SOLUTION INTRAVENOUS CONTINUOUS
Status: DISCONTINUED | OUTPATIENT
Start: 2025-04-02 | End: 2025-04-03

## 2025-04-02 RX ORDER — VITAMIN A, VITAMIN C, VITAMIN D-3, VITAMIN E, VITAMIN B-1, VITAMIN B-2, NIACIN, VITAMIN B-6, CALCIUM, IRON, ZINC, COPPER 4000; 120; 400; 22; 1.84; 3; 20; 10; 1; 12; 200; 27; 25; 2 [IU]/1; MG/1; [IU]/1; MG/1; MG/1; MG/1; MG/1; MG/1; MG/1; UG/1; MG/1; MG/1; MG/1; MG/1
1 TABLET ORAL EVERY EVENING
COMMUNITY

## 2025-04-02 RX ORDER — NITROFURANTOIN 25; 75 MG/1; MG/1
100 CAPSULE ORAL EVERY 12 HOURS SCHEDULED
Status: DISCONTINUED | OUTPATIENT
Start: 2025-04-02 | End: 2025-04-03

## 2025-04-02 RX ORDER — FLUMAZENIL 0.1 MG/ML
0.2 INJECTION, SOLUTION INTRAVENOUS
Status: DISCONTINUED | OUTPATIENT
Start: 2025-04-02 | End: 2025-04-03 | Stop reason: HOSPADM

## 2025-04-02 RX ADMIN — DIPHENHYDRAMINE HYDROCHLORIDE 25 MG: 25 CAPSULE ORAL at 17:56

## 2025-04-02 RX ADMIN — CLONIDINE HYDROCHLORIDE 0.1 MG: 0.1 TABLET ORAL at 17:56

## 2025-04-02 RX ADMIN — SODIUM CHLORIDE: 9 INJECTION, SOLUTION INTRAVENOUS at 02:59

## 2025-04-02 RX ADMIN — FOLIC ACID 1 MG: 1 TABLET ORAL at 07:55

## 2025-04-02 RX ADMIN — POTASSIUM & SODIUM PHOSPHATES POWDER PACK 280-160-250 MG 2 PACKET: 280-160-250 PACK at 11:16

## 2025-04-02 RX ADMIN — NITROFURANTOIN MONOHYDRATE/MACROCRYSTALS 100 MG: 75; 25 CAPSULE ORAL at 07:55

## 2025-04-02 RX ADMIN — DOLUTEGRAVIR SODIUM 50 MG: 50 TABLET, FILM COATED ORAL at 16:18

## 2025-04-02 RX ADMIN — NITROFURANTOIN MONOHYDRATE/MACROCRYSTALS 100 MG: 75; 25 CAPSULE ORAL at 20:26

## 2025-04-02 RX ADMIN — POTASSIUM & SODIUM PHOSPHATES POWDER PACK 280-160-250 MG 2 PACKET: 280-160-250 PACK at 03:39

## 2025-04-02 RX ADMIN — MAGNESIUM SULFATE HEPTAHYDRATE 2 G: 40 INJECTION, SOLUTION INTRAVENOUS at 09:49

## 2025-04-02 RX ADMIN — NITROFURANTOIN MONOHYDRATE/MACROCRYSTALS 100 MG: 75; 25 CAPSULE ORAL at 00:06

## 2025-04-02 RX ADMIN — SODIUM CHLORIDE, SODIUM LACTATE, POTASSIUM CHLORIDE, AND CALCIUM CHLORIDE 1000 ML: .6; .31; .03; .02 INJECTION, SOLUTION INTRAVENOUS at 01:20

## 2025-04-02 RX ADMIN — TRAZODONE HYDROCHLORIDE 50 MG: 50 TABLET ORAL at 22:35

## 2025-04-02 RX ADMIN — PREGABALIN 75 MG: 75 CAPSULE ORAL at 20:28

## 2025-04-02 RX ADMIN — CLONIDINE HYDROCHLORIDE 0.1 MG: 0.1 TABLET ORAL at 09:49

## 2025-04-02 RX ADMIN — ONDANSETRON 4 MG: 4 TABLET, ORALLY DISINTEGRATING ORAL at 13:01

## 2025-04-02 RX ADMIN — THIAMINE HCL TAB 100 MG 100 MG: 100 TAB at 07:55

## 2025-04-02 RX ADMIN — Medication 1 TABLET: at 07:55

## 2025-04-02 RX ADMIN — POTASSIUM & SODIUM PHOSPHATES POWDER PACK 280-160-250 MG 2 PACKET: 280-160-250 PACK at 07:55

## 2025-04-02 RX ADMIN — CLONIDINE HYDROCHLORIDE 0.1 MG: 0.1 TABLET ORAL at 02:23

## 2025-04-02 RX ADMIN — DULOXETINE 30 MG: 30 CAPSULE, DELAYED RELEASE ORAL at 16:18

## 2025-04-02 ASSESSMENT — ACTIVITIES OF DAILY LIVING (ADL)
ADLS_ACUITY_SCORE: 50
ADLS_ACUITY_SCORE: 52
ADLS_ACUITY_SCORE: 41
ADLS_ACUITY_SCORE: 58
ADLS_ACUITY_SCORE: 58
ADLS_ACUITY_SCORE: 50
ADLS_ACUITY_SCORE: 50
ADLS_ACUITY_SCORE: 52
ADLS_ACUITY_SCORE: 41
ADLS_ACUITY_SCORE: 48
ADLS_ACUITY_SCORE: 52
ADLS_ACUITY_SCORE: 50
ADLS_ACUITY_SCORE: 48
ADLS_ACUITY_SCORE: 41
ADLS_ACUITY_SCORE: 58
ADLS_ACUITY_SCORE: 44
ADLS_ACUITY_SCORE: 58
ADLS_ACUITY_SCORE: 58
ADLS_ACUITY_SCORE: 48
ADLS_ACUITY_SCORE: 58
ADLS_ACUITY_SCORE: 52
ADLS_ACUITY_SCORE: 58
ADLS_ACUITY_SCORE: 50

## 2025-04-02 NOTE — PLAN OF CARE
ROOM # 221    Living Situation (if not independent, order SW consult): home w/ family  Facility name:  : [facility] [phone]       Activity level at baseline: Independent  Activity level on admit: Ax1 w/ walker and GB    Who will be transporting you at discharge: Father    Patient registered to observation; given Patient Bill of Rights; given the opportunity to ask questions about observation status and their plan of care.  Patient has been oriented to the observation room, bathroom and call light is in place.    Discussed discharge goals and expectations with patient/family.       OBSERVATION patient IN TIME: 0824    pended

## 2025-04-02 NOTE — PROGRESS NOTES
Transported from ED to OBS. Discussed briefly with accepting RN. AM meds passed - pt drinking phos packets in water at time of transport. K and Mag protocols added at time of transfer -

## 2025-04-02 NOTE — PLAN OF CARE
"PRIMARY DIAGNOSIS: Transaminitis; N/V    OUTPATIENT/OBSERVATION GOALS TO BE MET BEFORE DISCHARGE  1. Orthostatic performed: N/A    2. Tolerating PO medications: Yes    3. Return to near baseline physical activity: Yes    4. Cleared for discharge by consultants (if involved): No    Discharge Planner Nurse   Safe discharge environment identified: Yes  Barriers to discharge: Yes       Entered by: Amber Valerio RN 04/02/2025   A&Ox4. VSS; intermittent tachycardic; on room air. C/O nausea; PRN zofran given. Reported a 'weird tongue pain'. Family and pt report lips appear more swollen. Provider notified and PRN benadryl given. Numbness and tingling to bilateral feet. CIWA; 2-4; tremors noted. Phos and Mag replaced. SBA w/ walker. Tolerating regular diet; minimal appetite. Macrobid for UTI. Three watery stools; provider notified; sample needing to be collected. On contact precautions for ESBL and enteric for C-diff rule-out. POC ongoing. Call light in reach; pt able to make needs known.  /87   Pulse 94   Temp 98.1  F (36.7  C) (Oral)   Resp 17   Ht 1.702 m (5' 7\")   Wt 51.3 kg (113 lb 1.6 oz)   LMP 03/30/2025 (Approximate)   SpO2 100%   BMI 17.71 kg/m    Please review provider order for any additional goals.   Nurse to notify provider when observation goals have been met and patient is ready for discharge.    Goal Outcome Evaluation:      Plan of Care Reviewed With: patient    Overall Patient Progress: improvingOverall Patient Progress: improving    Outcome Evaluation: A&Ox4. VSS; intermittent tachycardic; on room air. CIWAs - mild tremors. Phos and Mag replaced.      "

## 2025-04-02 NOTE — ED PROVIDER NOTES
Emergency Department Note      History of Present Illness     Chief Complaint   Vomiting and Numbness      HPI   Colleen Espino is a 24 year old female with a history of HIV, peripheral neuropathy and alcohol use disorder who presents with nausea, vomiting, dysuria, chills and worsening leg weakness. Patient reports vomiting since this morning and is currently nauseous. Reports dysuria that started today and also notes a runny nose. Patient reports she has not been treated for her H.pylori infection because she is currently taking HIV medications. She denies any recent medication changes other than the new medications after her hospitalization one month ago. Denies any missed doses of her HIV medication. She denies history of similar symptoms, denies cough, chest pain, shortness of breath, abdominal pain, neck pain or back pain. Patient reports an upcoming appointment with a neurologist and a GI specialist. Reports history of UTI.     Independent Historian   None    Review of External Notes   I reviewed a 3/13/25 discharge summary. I reviewed 3/19/25 [facility] note for admission folow     Past Medical History     Medical History and Problem List   Insomnia  ABRAHAM  HIV positive  Peripheral neuropathy  Alcohol dependence  Latent tuberculosis  H. Pylori     Medications   Atarax  Vistaril  Zofran    Surgical History   D&C with pap smear, nexplanon implant     Physical Exam     Patient Vitals for the past 24 hrs:   BP Temp Temp src Pulse Resp SpO2   04/01/25 2107 (!) 127/98 97.9  F (36.6  C) Oral 117 20 99 %     Physical Exam  Constitutional:       General: Not in acute distress.        Appearance: Normal appearance.   HENT:      Head: Normocephalic and atraumatic.   Eyes:      Extraocular Movements: Extraocular movements intact.      Conjunctiva/sclera: Conjunctivae normal.   Cardiovascular:      Rate and Rhythm: Normal rate and regular rhythm.   Pulmonary:      Effort: Pulmonary effort is normal. No respiratory  distress.      Breath sounds: Normal breath sounds.   Abdominal:      General: Abdomen is flat. There is no distension.      Palpations: Abdomen is soft.      Tenderness: There is no abdominal tenderness.  No CVA tenderness to palpation.  Musculoskeletal:      Cervical back: Normal range of motion. No rigidity.      Right lower leg: No edema.      Left lower leg: No edema.   Skin:     General: Skin is warm and dry.   Neurological:      General: No focal deficit present.      Mental Status: Alert and oriented to person, place, and time.   Psychiatric:         Mood and Affect: Mood normal.         Behavior: Behavior normal.    Diagnostics     Lab Results   Labs Ordered and Resulted from Time of ED Arrival to Time of ED Departure   GLUCOSE BY METER - Abnormal       Result Value    GLUCOSE BY METER POCT 127 (*)    COMPREHENSIVE METABOLIC PANEL - Abnormal    Sodium 134 (*)     Potassium 3.6      Carbon Dioxide (CO2) 27      Anion Gap 14      Urea Nitrogen 10.0      Creatinine 0.51      GFR Estimate >90      Calcium 9.8      Chloride 93 (*)     Glucose 114 (*)     Alkaline Phosphatase 166 (*)     AST 58 (*)     ALT 30      Protein Total 9.7 (*)     Albumin 4.6      Bilirubin Total 1.6 (*)    ROUTINE UA WITH MICROSCOPIC REFLEX TO CULTURE - Abnormal    Color Urine Orange (*)     Appearance Urine Slightly Cloudy (*)     Glucose Urine Negative      Bilirubin Urine Small (*)     Ketones Urine 10 (*)     Specific Gravity Urine 1.015      Blood Urine Small (*)     pH Urine 8.0 (*)     Protein Albumin Urine 200 (*)     Urobilinogen Urine 12.0 (*)     Nitrite Urine Negative      Leukocyte Esterase Urine Large (*)     Mucus Urine Present (*)     RBC Urine 11 (*)     WBC Urine 110 (*)     Squamous Epithelials Urine 2 (*)    ISTAT GASES LACTATE VENOUS POCT - Abnormal    Lactic Acid POCT 1.4      Bicarbonate Venous POCT 31 (*)     O2 Sat, Venous POCT 55 (*)     pCO2 Venous POCT 45      pH Venous POCT 7.45 (*)     pO2 Venous POCT 28      INFLUENZA A/B, RSV AND SARS-COV2 PCR - Normal    Influenza A PCR Negative      Influenza B PCR Negative      RSV PCR Negative      SARS CoV2 PCR Negative     MAGNESIUM - Normal    Magnesium 1.7     ETHYL ALCOHOL LEVEL - Normal    Alcohol ethyl <0.01     CBC WITH PLATELETS AND DIFFERENTIAL    WBC Count 10.2      RBC Count 4.35      Hemoglobin 14.1      Hematocrit 41.8      MCV 96      MCH 32.4      MCHC 33.7      RDW 13.8      Platelet Count 246      % Neutrophils 80      % Lymphocytes 14      % Monocytes 6      % Eosinophils 0      % Basophils 0      % Immature Granulocytes 0      NRBCs per 100 WBC 0      Absolute Neutrophils 8.2      Absolute Lymphocytes 1.4      Absolute Monocytes 0.6      Absolute Eosinophils 0.0      Absolute Basophils 0.0      Absolute Immature Granulocytes 0.0      Absolute NRBCs 0.0     HCG QUALITATIVE PREGNANCY   BLOOD CULTURE   BLOOD CULTURE   URINE CULTURE     Imaging   XR Chest 2 Views   Final Result   IMPRESSION: Negative chest.        Independent Interpretation   CXR: See ED course.    ED Course      Medications Administered   Medications   lactated ringers BOLUS 1,000 mL (1,000 mLs Intravenous $New Bag 4/1/25 2336)   cefTRIAXone (ROCEPHIN) 2 g vial to attach to  ml bag for ADULTS or NS 50 ml bag for PEDS (has no administration in time range)   ondansetron (ZOFRAN ODT) ODT tab 4 mg (4 mg Oral $Given 4/1/25 2114)   metoclopramide (REGLAN) injection 10 mg (10 mg Intravenous $Given 4/1/25 2337)   diphenhydrAMINE (BENADRYL) injection 25 mg (25 mg Intravenous $Given 4/1/25 2337)     Procedures   Procedures     Discussion of Management   See ED course    ED Course   ED Course as of 04/02/25 0348   Tue Apr 01, 2025   2339 WBC: 10.2  Reassuring.   2356 XR Chest 2 Views  Independent interpretation of chest x-ray, there is no obvious focal opacity, mediastinal widening, or pneumothorax.   2359 I spoke to pharmacy, regarding the patient. ESBL sensitive to Macrobid.    Wed Apr 02, 2025    0020 On reevaluation, patient still feeling unwell.  Still tachycardic.  Patient does not feel comfortable with discharge home at this time, will admit patient observation admission which I do not believe is unreasonable given history of ESBL UTI and immunocompromise status.   0037 Discussed patient with hospitalist Dr. Pratt who will evaluate the patient and decide if appropriate for observation admission.   0040 Discussed patient with hospitalist Dr. Pratt who accepted the patient for admission.       Additional Documentation  None    Medical Decision Making / Diagnosis     CMS Diagnoses: None    MIPS       None    MDM   Colleen Espino is a 24-year-old female as described above presents to the emergency department for nausea and vomiting today.  Patient endorses that she is compliant with her HIV medications.  Patient however reports that over-the-counter medications today for her nausea were unsuccessful at home.  Patient endorses no associated abdominal pain, but endorses subjective fever, chills, and dysuria that started today.  Differential diagnosis considered includes, but not limited to, acute viral syndrome, viral gastroenteritis, foodborne illness, medication side effect (patient on multidrug regimen for HIV and neuropathic pain.  History of heavy alcohol use as well.  Will obtain lab work for evaluation for electrolyte derangements and immunocompromise status.  Viral swab negative today.  Chest x-ray for evaluation for pneumonia.  Urinalysis for evaluation for UTI.  IV fluid hydration.  Patient endorses return of nausea despite p.o. Zofran, will trial Reglan and Benadryl.  In regards to patient's HIV, neuropathic pain and H. Pylori, she has outpatient GI, neurology, and infectious disease follow-up no new issues on these f new concerns on these issues today.  Discussed care plan with patient who voiced understanding and agreement with plan.  Answered all questions.  Additional workup and orders as  listed in chart.    Ultimately, work up shows urinary tract infection.  Patient remains persistently tachycardic and overall feeling unwell.  Original plan was to potential discharge home on Macrobid per review of prior urine culture, but patient does not feel comfortable with discharge home given her current condition.  Especially given history of HIV and malnourishment and new transaminitis, patient was admitted to the hospital service for observation and further evaluation and treatment.    Please refer to ED course above as part of continuation of MDM for details on the patient's treatment course and any potential changes or updates beyond my initial evaluation and MDM creation.      Disposition   The patient was admitted to the hospital.     Diagnosis     ICD-10-CM    1. Urinary tract infection without hematuria, site unspecified  N39.0            Discharge Medications   New Prescriptions    No medications on file       Scribe Disclosure:  I, Tiffanie Nelson, am serving as a scribe at 10:05 PM on 4/1/2025 to document services personally performed by Ben Torres DO based on my observations and the provider's statements to me.        Ben Torres DO  04/02/25 0350

## 2025-04-02 NOTE — ED NOTES
"[facility]  ED Nurse Handoff Report    ED Chief complaint: Vomiting and Numbness  . ED Diagnosis:   Final diagnoses:   Urinary tract infection without hematuria, site unspecified   Human immunodeficiency virus (HIV) disease (H)   Transaminitis       Allergies: No Known Allergies    Code Status: Full Code    Activity level - Baseline/Home:  independent.  Activity Level - Current:   standby.   Lift room needed: No.   Bariatric: No   Needed: No   Isolation: No.   Infection: Not Applicable.     Respiratory status: Room air    Vital Signs (within 30 minutes):   Vitals:    04/02/25 0000 04/02/25 0004 04/02/25 0009 04/02/25 0120   BP: 123/79      Pulse: 108  90    Resp:  16     Temp:       TempSrc:       SpO2:  100% 98%    Weight:    49.9 kg (110 lb)   Height:    1.702 m (5' 7\")       Cardiac Rhythm:  ,   Cardiac  Cardiac Rhythm: Normal sinus rhythm  Pain level:    Patient confused: No.   Patient Falls Risk: patient and family education, assistive device/personal items within reach, activity supervised, and toileting schedule implemented.   Elimination Status: Has voided     Patient Report - Initial Complaint: nausea/vomiting, b/l leg numbness.   Focused Assessment: pt from home w/ complaints of nausea/vomiting, chills, and b/l leg numbness. Recent admission at [facility] for the leg numbness. UA indicating UTI. Pt is A/Ox4, on RA, SBA to RR, continent B/B, skin is intact, PERRLA, ABCs intact, denies current chest pain/SOB. AVSS, Calm/cooperative.     Abnormal Results:   Labs Ordered and Resulted from Time of ED Arrival to Time of ED Departure   GLUCOSE BY METER - Abnormal       Result Value    GLUCOSE BY METER POCT 127 (*)    COMPREHENSIVE METABOLIC PANEL - Abnormal    Sodium 134 (*)     Potassium 3.6      Carbon Dioxide (CO2) 27      Anion Gap 14      Urea Nitrogen 10.0      Creatinine 0.51      GFR Estimate >90      Calcium 9.8      Chloride 93 (*)     Glucose 114 (*)     Alkaline Phosphatase " 166 (*)     AST 58 (*)     ALT 30      Protein Total 9.7 (*)     Albumin 4.6      Bilirubin Total 1.6 (*)    ROUTINE UA WITH MICROSCOPIC REFLEX TO CULTURE - Abnormal    Color Urine Orange (*)     Appearance Urine Slightly Cloudy (*)     Glucose Urine Negative      Bilirubin Urine Small (*)     Ketones Urine 10 (*)     Specific Gravity Urine 1.015      Blood Urine Small (*)     pH Urine 8.0 (*)     Protein Albumin Urine 200 (*)     Urobilinogen Urine 12.0 (*)     Nitrite Urine Negative      Leukocyte Esterase Urine Large (*)     Mucus Urine Present (*)     RBC Urine 11 (*)     WBC Urine 110 (*)     Squamous Epithelials Urine 2 (*)    ISTAT GASES LACTATE VENOUS POCT - Abnormal    Lactic Acid POCT 1.4      Bicarbonate Venous POCT 31 (*)     O2 Sat, Venous POCT 55 (*)     pCO2 Venous POCT 45      pH Venous POCT 7.45 (*)     pO2 Venous POCT 28     INFLUENZA A/B, RSV AND SARS-COV2 PCR - Normal    Influenza A PCR Negative      Influenza B PCR Negative      RSV PCR Negative      SARS CoV2 PCR Negative     MAGNESIUM - Normal    Magnesium 1.7     ETHYL ALCOHOL LEVEL - Normal    Alcohol ethyl <0.01     HCG QUALITATIVE PREGNANCY - Normal    hCG Serum Qualitative Negative     CBC WITH PLATELETS AND DIFFERENTIAL    WBC Count 10.2      RBC Count 4.35      Hemoglobin 14.1      Hematocrit 41.8      MCV 96      MCH 32.4      MCHC 33.7      RDW 13.8      Platelet Count 246      % Neutrophils 80      % Lymphocytes 14      % Monocytes 6      % Eosinophils 0      % Basophils 0      % Immature Granulocytes 0      NRBCs per 100 WBC 0      Absolute Neutrophils 8.2      Absolute Lymphocytes 1.4      Absolute Monocytes 0.6      Absolute Eosinophils 0.0      Absolute Basophils 0.0      Absolute Immature Granulocytes 0.0      Absolute NRBCs 0.0     LIPASE   BILIRUBIN DIRECT   BLOOD CULTURE   BLOOD CULTURE   URINE CULTURE        XR Chest 2 Views   Final Result   IMPRESSION: Negative chest.      US Abdomen Limited (RUQ)    (Results Pending)        Treatments provided: IV fluids, benadryl, reglan, PO abx  Family Comments: father left bedside  OBS brochure/video discussed/provided to patient:  Yes  ED Medications:   Medications   lactated ringers BOLUS 1,000 mL (1,000 mLs Intravenous $New Bag 4/2/25 0120)   ondansetron (ZOFRAN ODT) ODT tab 4 mg (4 mg Oral $Given 4/1/25 2114)   lactated ringers BOLUS 1,000 mL (0 mLs Intravenous Stopped 4/2/25 0121)   metoclopramide (REGLAN) injection 10 mg (10 mg Intravenous $Given 4/1/25 2337)   diphenhydrAMINE (BENADRYL) injection 25 mg (25 mg Intravenous $Given 4/1/25 2337)   nitroFURantoin macrocrystal-monohydrate (MACROBID) capsule 100 mg (100 mg Oral $Given 4/2/25 0006)       Drips infusing:  No  For the majority of the shift this patient was Green.   Interventions performed were n/a.    Sepsis treatment initiated: No    Cares/treatment/interventions/medications to be completed following ED care: see provider orders    ED Nurse Name: Rosa Vigil RN  12:23 AM

## 2025-04-02 NOTE — H&P
[facility]    Hospitalist History and Physical    Name: Colleen Espino    MRN: [id number]  YOB: 2000    Age: 24 year old  Date of Admission:  4/1/2025  Date of Service (when I saw the patient): 04/02/25    Assessment & Plan   Colleen Espino is a 24 year old female with past medical history is significant for HIV (most recent viral load >500,000, CD4 200, intermittently compliant with ART), alcohol use disorder with hepatic steatosis, chronic diarrhea, latent tuberculosis (history of active pulm TB s/p treatment 2011), peripheral neuropathy reactive airway disease, h/o H pylori, ESBL UTI, anorexia, generalized anxiety, and major depressive disorder.     She was recently hospitalized  (3/5/25- 3/13/25 )at [place] for ascending lower extremity weakness and pain was admitted to peripheral neuropathy. MRI brain, thoracic spine, cervical spine, and lumbar spine revealed questionable findings suggestive of possible meningitis with recommended repeat MRI with IV contrast for stability/progression. 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 Neurology team considering sensory predominant peripheral neuropathy related to nutritional deficiencies as leading differential.  Lumbar puncture was pursued on 03/10, results were significant for a positive EBV and elevated kappa free light chains. Infectious Disease did not think that EBV needed to be treated as patient was not encephalopathic or somnolent.  Neurology requested further serum studies for elevated kappa free light chain. The CSF cytology was negative for any evidence of malignancy.     She Presented to the emergency room 4/1/2025 with nausea vomiting unable to keep anything down and dysuria.  Evaluation in the emergency room showed abnormal UA and abnormal LFTs    Nausea vomiting  -Likely multifactorial secondary to UTI, alcohol dependence, history of H. pylori,  HIV meds  -Supportive care  -IV fluids  -Advance diet as tolerated    UTI  -Symptomatic with dysuria, normal UA  -History of ESBL  -Cultures ordered pending  -Started on Macrobid in the emergency room will continue  -Contact precautions  -Consider ID consult pending cultures    Abnormal LFTs  -?  Etiology patient has history of alcohol use disorder Daily alcohol use, patient also on antiretroviral medications  -On recent hospitalization at [facility] had abnormal transaminases.  AST ALT lower than recent hospitalization  -Today also has elevated hyperbilirubinemia and alkaline phosphatase  -With symptoms of nausea and vomiting and dyspepsia will check right upper quadrant ultrasound  -Trend LFTs, will check direct bili and lipase    HIV positive  -Diagnosed with HIV at birth gnosis when she was 15 in [place]  -Recent viral load was more than 5 500,000 was seen in ID clinic at [facility]  -Intermittently on antiretroviral therapy  -Continue HIV meds once reconciled  -Monitor LFTs and blood counts  -Consider ID consult    Severe malnutrition  -Recent evaluation with known nutritional deficiencies  -Continue prior to admission supplements  -Consider nutritional consult  -Advance diet as tolerated    Alcohol use disorder  -Daily alcohol use no history of withdrawals  -Thiamine folate multivitamin  -Will have CIWA protocol available although no signs of active withdrawal    Insomnia  Anxiety and depression  -Resume prior to admission meds once reconciled    Clinically Significant Risk Factors Present on Admission         # Hyponatremia: Lowest Na = 134 mmol/L in last 2 days, will monitor as appropriate  # Hypochloremia: Lowest Cl = 93 mmol/L in last 2 days, will monitor as appropriate                             MISC: Please review and reorder meds once reconciled in a.m.       DVT Prophylaxis: Pneumatic Compression Devices  Code Status: Full Code    Disposition: Admitted for observation  Medically Ready for Discharge:  Anticipated Tomorrow      Primary Care Physician   Physician No Ref-Primary    Chief Complaint   Nausea vomiting and dysuria 1 day    History is obtained from the patient    History of Present Illness   Colleen Espino is a 24 year old female with past medical history is significant for HIV (most recent viral load >500,000, CD4 200, intermittently compliant with ART), alcohol use disorder with hepatic steatosis, chronic diarrhea, latent tuberculosis (history of active pulm TB s/p treatment 2011), peripheral neuropathy reactive airway disease, h/o H pylori, ESBL UTI, anorexia, generalized anxiety, and major depressive disorder.     She was recently hospitalized  (3/5/25- 3/13/25 )at [place] for ascending lower extremity weakness and pain was diagnosed with peripheral neuropathy.  She Presented to the emergency room 4/1/2025 with nausea vomiting unable to keep anything down and dysuria.      Patient complains of nausea and vomiting every 30 minutes to an hour for last 24 hours cannot keep anything down.  Also complains of associated dysuria.  Has underlying dyspepsia chronically.  Denies any abdominal pain.  She says she feels cold and hot intermittently, but denies any rigors or fever.  Complains of associated burning with urination.  No abdominal pain complains of generalized malaise and weakness.  Poor oral intake.  Daily use of alcohol about 2-3 drinks every day.  More than 10 point review of system was carried out was otherwise negative.    Evaluation in the emergency room showed abnormal UA and abnormal LFTs    Past Medical History    No past medical history on file.  HIV  Peripheral neuropathy  Anxiety depression  Alcohol dependence  Vaping      Past Surgical History   Past Surgical History:   Procedure Laterality Date    DILATION AND CURETTAGE SUCTION WITH ULTRASOUND GUIDANCE N/A 6/24/2024    Procedure: Dilation and curettage suction with ultrasound guidance, pap smear and Nexplanon Implant;  Surgeon: Mary Ellen  Nish Christopher MD;  Location: [facility] OR    IMPLANT HORMONE Left 6/24/2024    Procedure: Implant hormone;  Surgeon: Nish Hyde MD;  Location: [facility] OR    PAPANICOLAOU SMEAR N/A 6/24/2024    Procedure: PAPANICOLAOU SMEAR;  Surgeon: Nish Hyde MD;  Location: [facility] OR       Prior to Admission Medications   Prior to Admission Medications   Prescriptions Last Dose Informant Patient Reported? Taking?   acetaminophen (TYLENOL) 325 MG tablet   No No   Sig: Take 2 tablets (650 mg) by mouth every 6 hours as needed for mild pain   albuterol (VENTOLIN HFA) 108 (90 BASE) MCG/ACT Inhaler   No No   Sig: Inhale 2 puffs into the lungs every 4 hours as needed for shortness of breath / dyspnea, wheezing or other (before exercise)   bismuth subsalicylate (PEPTO BISMOL) 262 MG/15ML suspension   No No   Sig: Take 30 mL by mouth every 60 minutes as needed for stomach upset, vomiting, diarrhea, indigestion. Do not take more than four doses (120 mL) in 24 hours.   hydrOXYzine HCl (ATARAX) 25 MG tablet   No No   Sig: Take 1-2 tablets (25-50 mg) by mouth 3 times daily as needed for itching   hydrOXYzine dalila (VISTARIL) 25 MG capsule   No No   Sig: Take 1 capsule (25 mg) by mouth every 8 hours as needed for anxiety   ibuprofen (ADVIL/MOTRIN) 600 MG tablet   No No   Sig: Take 1 tablet (600 mg) by mouth every 6 hours as needed for moderate pain   ondansetron (ZOFRAN ODT) 4 MG ODT tab   No No   Sig: Take 1 tablet (4 mg) by mouth every 8 hours as needed for nausea or vomiting.   triamcinolone (KENALOG) 0.1 % external cream   No No   Sig: Apply topically 2 times daily      Facility-Administered Medications: None     Allergies   No Known Allergies    Social History   Social History     Tobacco Use    Smoking status: Not on file    Smokeless tobacco: Not on file   Substance Use Topics    Alcohol use: Not on file     Social History     Social History Narrative    Not on file   Lives alone is trying to move back with family.  Is now  unemployed lost her job.  Continues to vape uses alcohol about 2-3 drinks per day      Family History   I have reviewed this patient's family history and updated it with pertinent information if needed.   No family history on file.  Mother has diabetes mellitus    Review of Systems   A Comprehensive greater than 10 system review of systems was carried out.  Pertinent positives and negatives are noted above.  Otherwise negative for contributory information.    Physical Exam   Temp: 97.9  F (36.6  C) Temp src: Oral BP: 123/79 Pulse: 90   Resp: 16 SpO2: 98 % O2 Device: None (Room air)    Vital Signs with Ranges  Temp:  [97.9  F (36.6  C)] 97.9  F (36.6  C)  Pulse:  [] 90  Resp:  [16-20] 16  BP: (123-127)/(79-98) 123/79  SpO2:  [98 %-100 %] 98 %  0 lbs 0 oz    GEN:  Alert, oriented x 3, appears comfortable, thin and cachectic  HEENT:  Normocephalic/atraumatic, no scleral icterus, no nasal discharge, mouth dry  CV:  Regular rate and rhythm, no murmur or JVD.  S1 + S2 noted, no S3 or S4.  LUNGS:  Clear to auscultation bilaterally without rales/rhonchi/wheezing/retractions.  Symmetric chest rise on inhalation noted.  ABD:  Active bowel sounds, soft, non-tender/non-distended.  No rebound/guarding/rigidity.  EXT:  No edema.  No cyanosis.  No joint synovitis noted.  SKIN:  Dry to touch, no exanthems noted in the visualized areas.  NEURO:  Symmetric muscle strength,  No new focal deficits appreciated.    Data   Data reviewed today:  I personally reviewed the right upper quadrant image(s) showing pending .    Recent Labs   Lab 04/01/25 2321   WBC 10.2   HGB 14.1   HCT 41.8   MCV 96        Recent Labs   Lab 04/01/25 2321 04/01/25 2112   *  --    POTASSIUM 3.6  --    CHLORIDE 93*  --    CO2 27  --    ANIONGAP 14  --    * 127*   BUN 10.0  --    CR 0.51  --    GFRESTIMATED >90  --    WANDA 9.8  --      7-Day Micro Results       Collected Updated Procedure Result Status      04/01/2025 2336 04/01/2025 8946  "Blood Culture Arm, Left [[id number]]   Blood from Arm, Left    In process Component Value   No component results               04/01/2025 2321 04/01/2025 2334 Blood Culture Peripheral Blood [[id number]]   Peripheral Blood    In process Component Value   No component results               04/01/2025 2321 04/01/2025 2353 Urine Culture [[id number]]   Urine, Midstream    In process Component Value   No component results               04/01/2025 2117 04/01/2025 2216 Influenza A/B, RSV and SARS-CoV2 PCR (COVID-19) Nasopharyngeal [[id number]]    Swab from Nasopharyngeal    Final result Component Value   Influenza A PCR Negative   Influenza B PCR Negative   RSV PCR Negative   SARS CoV2 PCR Negative   NEGATIVE: SARS-CoV-2 (COVID-19) RNA not detected, presumed negative.                  Recent Labs   Lab 04/01/25 2321   HGB 14.1     Recent Labs   Lab 04/01/25 2321   AST 58*   ALT 30   ALKPHOS 166*   BILITOTAL 1.6*     No results for input(s): \"INR\" in the last 168 hours.  Recent Labs   Lab 04/01/25  2322   LACT 1.4     No results for input(s): \"LIPASE\" in the last 168 hours.  No results for input(s): \"CHOL\", \"HDL\", \"LDL\", \"TRIG\", \"CHOLHDLRATIO\" in the last 168 hours.  No results for input(s): \"TROPONIN\", \"TROPI\", \"TROPR\", \"TROPONINIS\" in the last 168 hours.    Invalid input(s): \"TROPT\", \"TROP\", \"TROPONINIES\", \"TNIH\"  Recent Labs   Lab 04/01/25  2321   COLOR Orange*   APPEARANCE Slightly Cloudy*   URINEGLC Negative   URINEBILI Small*   URINEKETONE 10*   SG 1.015   UBLD Small*   URINEPH 8.0*   PROTEIN 200*   NITRITE Negative   LEUKEST Large*   RBCU 11*   WBCU 110*       Recent Results (from the past 24 hours)   XR Chest 2 Views    Narrative    EXAM: XR CHEST 2 VIEWS  LOCATION: [facility]  DATE: 4/1/2025    INDICATION: pneumonia evaluation  COMPARISON: 9/17/2024      Impression    IMPRESSION: Negative chest.        "

## 2025-04-02 NOTE — ED TRIAGE NOTES
Patient states that she has been vomiting since this morning, unable to stop, feels hot and cold all day unknown if had fever. Also endorses having leg numbness, states its something with her veins. Notes she was at [place] less than a month ago for leg issues. Has not gone away but feels worse today. Able to stand and move legs but feels weak and shaky. ABCs intact. VSS     Triage Assessment (Adult)       Row Name 04/01/25 3935          Triage Assessment    Airway WDL WDL        Respiratory WDL    Respiratory WDL WDL        Skin Circulation/Temperature WDL    Skin Circulation/Temperature WDL WDL        Cardiac WDL    Cardiac WDL WDL        Peripheral/Neurovascular WDL    Peripheral Neurovascular WDL WDL        Cognitive/Neuro/Behavioral WDL    Cognitive/Neuro/Behavioral WDL WDL

## 2025-04-02 NOTE — PLAN OF CARE
Patient seen and examined and H&P, labs, vitals, etc reviewed. Sounds like she's going through some alcohol withdrawal, last drink was Monday and has hand tremors. [place] note reviewed and appears was on lyrica so will hold off on gabapentin taper and just use CIWA/ativan for now. Awaiting med rec but brought in her home HIV meds as don't appear to be on formulary. Tolerating clears, no further NV. Awaiting med rec to resume her meds.

## 2025-04-02 NOTE — DISCHARGE INSTRUCTIONS
Please follow-up with your primary care provider and/or specialist regarding your visit to the ER today.    Please return to the emergency department should you experience any of the symptoms we specifically discussed, including but not limited to recurrence or worsening of your symptoms, or development of any new and concerning symptoms such as fever, flank pain, or persistent nausea, vomiting, or abdominal pain.

## 2025-04-02 NOTE — PHARMACY-ADMISSION MEDICATION HISTORY
Pharmacist Admission Medication History    Admission medication history is complete. The information provided in this note is only as accurate as the sources available at the time of the update.    Information Source(s): Patient, Hospital records, and CareEverywhere/SureScripts via in-person and phone    Pertinent Information: Patient states that she takes the prenatal vitamin and zinc in the evening to avoid interactions with the Tivicay and Odefsey medications. Of note, vitamin D is to also be take 4 hours after HIV meds to avoid interaction.   Patient took several of her PTA meds yesterday but vomited shortly after taking the medications.     Changes made to PTA medication list:  Added: all except: acetaminophen, ibuprofen, Zofran, hydroxyzine, pepto bismol, triamcinolone  Deleted: albuterol  Changed: hydroxyzine, triamcinolone to as needed     Allergies reviewed with patient and updates made in EHR: no    Medication History Completed By: Alexandra Lindsay [facility] 4/2/2025 3:17 PM    PTA Med List   Medication Sig Last Dose/Taking    acetaminophen (TYLENOL) 500 MG tablet Take 1,000 mg by mouth as needed for pain. Taking As Needed    bismuth subsalicylate (PEPTO BISMOL) 262 MG/15ML suspension Take 30 mL by mouth every 60 minutes as needed for stomach upset, vomiting, diarrhea, indigestion. Do not take more than four doses (120 mL) in 24 hours. Taking    capsaicin (ZOSTRIX) 0.025 % external cream Apply topically 3 times daily as needed. Taking As Needed    cholecalciferol (VITAMIN D3) 1250 mcg (23330 units) capsule Take 1,250 mcg by mouth every 7 days. Take 4 hours after HIV medication to prevent interaction Past Month    diclofenac (VOLTAREN) 1 % topical gel Apply 2 g topically 4 times daily as needed for moderate pain. Leg/feet pain Taking As Needed    dolutegravir (TIVICAY) 50 MG tablet Take 50 mg by mouth daily. Taking    DULoxetine (CYMBALTA) 30 MG capsule Take 30 mg by mouth daily. Taking     emtricitabine-rilpivirine-tenofovir (ODEFSEY) 200-25-25 MG TABS per tablet Take 1 tablet by mouth daily (with breakfast). Taking    etonogestrel (NEXPLANON) 68 MG IMPL 1 each by Subdermal route See Admin Instructions. Taking    fluticasone (FLONASE) 50 MCG/ACT nasal spray Spray 2 sprays into both nostrils daily as needed for allergies or rhinitis. Taking As Needed    folic acid (FOLVITE) 1 MG tablet Take 1 mg by mouth daily. Taking    hydrOXYzine HCl (ATARAX) 25 MG tablet Take 25 mg by mouth every 8 hours as needed for itching or anxiety. Taking As Needed    ibuprofen (ADVIL/MOTRIN) 200 MG tablet Take 400 mg by mouth as needed for pain. Taking As Needed    Melatonin 10 MG TABS tablet Take 10 mg by mouth nightly as needed for sleep. Taking As Needed    ondansetron (ZOFRAN ODT) 4 MG ODT tab Take 1 tablet (4 mg) by mouth every 8 hours as needed for nausea or vomiting. Taking As Needed    pregabalin (LYRICA) 75 MG capsule Take 75 mg by mouth 2 times daily. Taking    Prenatal Vit-Fe Fumarate-FA (PRENATAL MULTIVITAMIN  PLUS IRON) 27-1 MG TABS Take 1 tablet by mouth every evening. Take at least 4 hours after HIV medications to prevent interaction Taking    thiamine (B-1) 100 MG tablet Take 100 mg by mouth daily. Taking    traZODone (DESYREL) 50 MG tablet Take 50 mg by mouth at bedtime. Past Week    triamcinolone (KENALOG) 0.1 % external cream Apply topically 2 times daily (Patient taking differently: Apply topically 2 times daily as needed.) Taking Differently    zinc sulfate (ZINCATE) 220 (50 Zn) MG capsule Take 220 mg by mouth every evening. Take 4 hours after HIV medications to prevent interactions Evening

## 2025-04-02 NOTE — PLAN OF CARE
"PRIMARY DIAGNOSIS: Transaminitis & UTI  OUTPATIENT/OBSERVATION GOALS TO BE MET BEFORE DISCHARGE:  ADLs back to baseline: No    Activity and level of assistance: Ax1/ walker and GB    Pain status: Pain free.    Return to near baseline physical activity: No     Discharge Planner Nurse   Safe discharge environment identified: Yes  Barriers to discharge: Yes       Entered by: Amber Valerio RN 04/02/2025  A&Ox4. VSS; tachycardic; on room air. Denies all pain, SOB, and current N/V/D. Reports numbness and tingling to bilateral feet. CIWA; 2; intermittent tremors noted. NS @ 100 ml/hr. Phos and Mag replaced. Ax1 w/ walker and GB. Tolerating regular diet. Bed alarm on for safety. Call light in reach; pt able to make needs known.   /79 (BP Location: Left arm)   Pulse 91   Temp 98.1  F (36.7  C) (Oral)   Resp 16   Ht 1.702 m (5' 7\")   Wt 51.3 kg (113 lb 1.6 oz)   LMP 03/30/2025 (Approximate)   SpO2 100%   BMI 17.71 kg/m    Please review provider order for any additional goals.   Nurse to notify provider when observation goals have been met and patient is ready for discharge.    Goal Outcome Evaluation:      Plan of Care Reviewed With: patient    Overall Patient Progress: improvingOverall Patient Progress: improving    Outcome Evaluation: A&Ox4. VSS; slightly tachycardic; on room air. Denies all pain. Reports numbness and tingling to feet. CIWA: 2 - mild tremors. NS @ 100 ml/hr. Phos and Mag replaced.      "

## 2025-04-03 VITALS
DIASTOLIC BLOOD PRESSURE: 88 MMHG | WEIGHT: 113.1 LBS | HEIGHT: 67 IN | SYSTOLIC BLOOD PRESSURE: 128 MMHG | RESPIRATION RATE: 16 BRPM | OXYGEN SATURATION: 99 % | BODY MASS INDEX: 17.75 KG/M2 | TEMPERATURE: 98.5 F | HEART RATE: 89 BPM

## 2025-04-03 LAB
ADV 40+41 DNA STL QL NAA+NON-PROBE: NEGATIVE
ALBUMIN SERPL BCG-MCNC: 3.5 G/DL (ref 3.5–5.2)
ALP SERPL-CCNC: 105 U/L (ref 40–150)
ALT SERPL W P-5'-P-CCNC: 20 U/L (ref 0–50)
ANION GAP SERPL CALCULATED.3IONS-SCNC: 10 MMOL/L (ref 7–15)
AST SERPL W P-5'-P-CCNC: 39 U/L (ref 0–45)
ASTRO TYP 1-8 RNA STL QL NAA+NON-PROBE: NEGATIVE
BACTERIA BLD CULT: NORMAL
BACTERIA BLD CULT: NORMAL
BACTERIA UR CULT: ABNORMAL
BACTERIA UR CULT: ABNORMAL
BILIRUB SERPL-MCNC: 1 MG/DL
BUN SERPL-MCNC: 4.5 MG/DL (ref 6–20)
C CAYETANENSIS DNA STL QL NAA+NON-PROBE: NEGATIVE
C DIFF GDH STL QL IA: POSITIVE
C DIFF TOX A+B STL QL IA: POSITIVE
CALCIUM SERPL-MCNC: 8.6 MG/DL (ref 8.8–10.4)
CAMPYLOBACTER DNA SPEC NAA+PROBE: NEGATIVE
CHLORIDE SERPL-SCNC: 103 MMOL/L (ref 98–107)
CREAT SERPL-MCNC: 0.59 MG/DL (ref 0.51–0.95)
CRYPTOSP DNA STL QL NAA+NON-PROBE: NEGATIVE
E COLI O157 DNA STL QL NAA+NON-PROBE: NORMAL
E HISTOLYT DNA STL QL NAA+NON-PROBE: NEGATIVE
EAEC ASTA GENE ISLT QL NAA+PROBE: NEGATIVE
EC STX1+STX2 GENES STL QL NAA+NON-PROBE: NEGATIVE
EGFRCR SERPLBLD CKD-EPI 2021: >90 ML/MIN/1.73M2
EPEC EAE GENE STL QL NAA+NON-PROBE: NEGATIVE
ETEC LTA+ST1A+ST1B TOX ST NAA+NON-PROBE: NEGATIVE
G LAMBLIA DNA STL QL NAA+NON-PROBE: NEGATIVE
GLUCOSE SERPL-MCNC: 89 MG/DL (ref 70–99)
HCO3 SERPL-SCNC: 25 MMOL/L (ref 22–29)
NOROVIRUS GI+II RNA STL QL NAA+NON-PROBE: NEGATIVE
P SHIGELLOIDES DNA STL QL NAA+NON-PROBE: NEGATIVE
POTASSIUM SERPL-SCNC: 3.6 MMOL/L (ref 3.4–5.3)
PROT SERPL-MCNC: 7.3 G/DL (ref 6.4–8.3)
RVA RNA STL QL NAA+NON-PROBE: NEGATIVE
SALMONELLA SP RPOD STL QL NAA+PROBE: NEGATIVE
SAPO I+II+IV+V RNA STL QL NAA+NON-PROBE: NEGATIVE
SHIGELLA SP+EIEC IPAH ST NAA+NON-PROBE: NEGATIVE
SODIUM SERPL-SCNC: 138 MMOL/L (ref 135–145)
V CHOLERAE DNA SPEC QL NAA+PROBE: NEGATIVE
VIBRIO DNA SPEC NAA+PROBE: NEGATIVE
Y ENTEROCOL DNA STL QL NAA+PROBE: NEGATIVE

## 2025-04-03 PROCEDURE — 36415 COLL VENOUS BLD VENIPUNCTURE: CPT | Performed by: HOSPITALIST

## 2025-04-03 PROCEDURE — 250N000013 HC RX MED GY IP 250 OP 250 PS 637: Performed by: HOSPITALIST

## 2025-04-03 PROCEDURE — 80051 ELECTROLYTE PANEL: CPT | Performed by: HOSPITALIST

## 2025-04-03 PROCEDURE — 84155 ASSAY OF PROTEIN SERUM: CPT | Performed by: HOSPITALIST

## 2025-04-03 PROCEDURE — 99239 HOSP IP/OBS DSCHRG MGMT >30: CPT | Performed by: HOSPITALIST

## 2025-04-03 PROCEDURE — G0378 HOSPITAL OBSERVATION PER HR: HCPCS

## 2025-04-03 PROCEDURE — 250N000013 HC RX MED GY IP 250 OP 250 PS 637: Performed by: INTERNAL MEDICINE

## 2025-04-03 RX ORDER — VANCOMYCIN HYDROCHLORIDE 125 MG/1
125 CAPSULE ORAL 4 TIMES DAILY
Status: DISCONTINUED | OUTPATIENT
Start: 2025-04-03 | End: 2025-04-03 | Stop reason: HOSPADM

## 2025-04-03 RX ORDER — GABAPENTIN 100 MG/1
100 CAPSULE ORAL 3 TIMES DAILY
Qty: 60 CAPSULE | Refills: 0 | Status: SHIPPED | OUTPATIENT
Start: 2025-04-03

## 2025-04-03 RX ORDER — VANCOMYCIN HYDROCHLORIDE 125 MG/1
125 CAPSULE ORAL 4 TIMES DAILY
Qty: 38 CAPSULE | Refills: 0 | Status: SHIPPED | OUTPATIENT
Start: 2025-04-03 | End: 2025-04-13

## 2025-04-03 RX ADMIN — EMTRICITABINE, RILPIVIRINE HYDROCHLORIDE, AND TENOFOVIR ALAFENAMIDE 1 TABLET: 200; 25; 25 TABLET ORAL at 08:25

## 2025-04-03 RX ADMIN — VANCOMYCIN HYDROCHLORIDE 125 MG: 125 CAPSULE ORAL at 12:10

## 2025-04-03 RX ADMIN — PREGABALIN 75 MG: 75 CAPSULE ORAL at 08:25

## 2025-04-03 RX ADMIN — FOLIC ACID 1 MG: 1 TABLET ORAL at 08:25

## 2025-04-03 RX ADMIN — THIAMINE HCL TAB 100 MG 100 MG: 100 TAB at 08:25

## 2025-04-03 RX ADMIN — DOLUTEGRAVIR SODIUM 50 MG: 50 TABLET, FILM COATED ORAL at 08:25

## 2025-04-03 RX ADMIN — CLONIDINE HYDROCHLORIDE 0.1 MG: 0.1 TABLET ORAL at 03:18

## 2025-04-03 RX ADMIN — DULOXETINE 30 MG: 30 CAPSULE, DELAYED RELEASE ORAL at 08:25

## 2025-04-03 RX ADMIN — VANCOMYCIN HYDROCHLORIDE 125 MG: 125 CAPSULE ORAL at 08:24

## 2025-04-03 RX ADMIN — VANCOMYCIN HYDROCHLORIDE 125 MG: 125 CAPSULE ORAL at 17:07

## 2025-04-03 ASSESSMENT — ACTIVITIES OF DAILY LIVING (ADL)
ADLS_ACUITY_SCORE: 58
ADLS_ACUITY_SCORE: 57
ADLS_ACUITY_SCORE: 58

## 2025-04-03 NOTE — DISCHARGE SUMMARY
[facility]  Hospitalist Discharge Summary      Date of Admission:  4/1/2025  Date of Discharge:  4/3/2025  Discharging Provider: Norm Olivo MD  Discharge Service: Hospitalist Service    Discharge Diagnoses   Nausea, vomiting  Diarrhea  C Diff colitis  Transaminitis    Clinically Significant Risk Factors          Follow-ups Needed After Discharge   Follow-up Appointments       Hospital to Primary Care - Establish PCP Referral      Please be aware that coverage of these services is subject to the terms and limitations of your health insurance plan.  Call member services at your health plan with any benefit or coverage questions.    Schedule Primary Care visit within: 14 Days   Recommended labs and Imaging (to be ordered by Primary Care Provider): follow-up with your [facility] team to discuss treatment for H Pylori               Unresulted Labs Ordered in the Past 30 Days of this Admission       Date and Time Order Name Status Description    4/1/2025 11:27 PM Blood Culture Arm, Left Preliminary     4/1/2025 10:34 PM Blood Culture Peripheral Blood Preliminary         These results will be followed up by hospitalist group    Discharge Disposition   Discharged to home  Condition at discharge: Stable    Hospital Course   Colleen Espino is a 24 year old female with past medical history is significant for HIV (most recent viral load >500,000, CD4 200, intermittently compliant with ART), alcohol use disorder with hepatic steatosis, chronic diarrhea, latent tuberculosis (history of active pulm TB s/p treatment 2011), peripheral neuropathy reactive airway disease, h/o H pylori, ESBL UTI, anorexia, generalized anxiety, and major depressive disorder.      She was recently hospitalized  (3/5/25- 3/13/25 )at [facility] for ascending lower extremity weakness and pain was diagnosed with peripheral neuropathy.  She Presented to the emergency room 4/1/2025 with nausea vomiting unable to keep anything down and dysuria.        Patient complains of nausea and vomiting every 30 minutes to an hour for last 24 hours cannot keep anything down.  Also complains of associated dysuria.  Has underlying dyspepsia chronically.  Denies any abdominal pain.  She says she feels cold and hot intermittently, but denies any rigors or fever.  Complains of associated burning with urination.  No abdominal pain complains of generalized malaise and weakness.  Poor oral intake.  Daily use of alcohol about 2-3 drinks every day.  More than 10 point review of system was carried out was otherwise negative.     Evaluation in the emergency room showed abnormal UA and abnormal LFTs     I assumed care of the patient today.  I met with her and her parents in the room.  I discussed her results with her.  She has C. difficile colitis.  We have started treatment with oral vancomycin.  I stopped the antibiotic for the UTI.  I reviewed her chart.  Her urine cultures have not grown anything since 2023 however she keeps getting treated for urinary tract infections.  The culture here is growing less than the thousand colonies of a normal urogenital bacteria.  I educated the patient and her parents on the differences between a urinalysis and a urine culture and the fact that typically we do not truly get clean-catches.  I indicated they have to be careful with her antibiotic use and providers need to make sure she actually has an infection before using these.  Patient is doing better.  She would like to discharge home.  I have checked in on her multiple times today.  I did call and left a message for her father as well.    Transaminitis resolved  Consultations This Hospital Stay   None    Code Status   Full Code    Time Spent on this Encounter   I, Norm Olivo MD, personally saw the patient today and spent greater than 30 minutes discharging this patient.       Norm Olivo MD  [facility] OBSERVATION DEPT  [address] [place]  [address]  Phone: [phone]  ______________________________________________________________________    Physical Exam   Vital Signs: Temp: 98.3  F (36.8  C) Temp src: Oral BP: 114/75 Pulse: 85   Resp: 16 SpO2: 100 % O2 Device: None (Room air)    Weight: 113 lbs 1.6 oz  Constitutional: awake, alert, cooperative, no apparent distress, and appears stated age  Eyes: Lids and lashes normal, pupils equal, round and reactive to light, extra ocular muscles intact, sclera clear, conjunctiva normal  ENT: Normocephalic, without obvious abnormality, atraumatic, sinuses nontender on palpation, external ears without lesions, oral pharynx with moist mucous membranes, tonsils without erythema or exudates, gums normal and good dentition.  Respiratory: No increased work of breathing, good air exchange, clear to auscultation bilaterally, no crackles or wheezing  Cardiovascular: Normal apical impulse, regular rate and rhythm, normal S1 and S2, no S3 or S4, and no murmur noted  GI: No scars, normal bowel sounds, soft, non-distended, non-tender, no masses palpated, no hepatosplenomegally       Primary Care Physician   Physician No Ref-Primary    Discharge Orders      Hospital to Primary Care - Establish PCP Referral      Reason for your hospital stay    Nausea, vomiting  Clostridium Difficile colitis     Activity    Your activity upon discharge: activity as tolerated     Diet    Follow this diet upon discharge: Current Diet:Orders Placed This Encounter      Regular Diet Adult       Significant Results and Procedures   Most Recent 3 CBC's:  Recent Labs   Lab Test 04/01/25  2321 12/24/24  1311 08/02/24  0043   WBC 10.2 10.2 6.0   HGB 14.1 13.9 14.3   MCV 96 104* 102*    365 265     Most Recent 3 BMP's:  Recent Labs   Lab Test 04/03/25  0617 04/02/25  0841 04/01/25  2321    137 134*   POTASSIUM 3.6 3.7 3.6   CHLORIDE 103 101 93*   CO2 25 27 27   BUN 4.5* 6.9 10.0   CR 0.59 0.51 0.51   ANIONGAP 10 9 14   WANDA 8.6* 8.6* 9.8    GLC 89 86 114*     Most Recent 2 LFT's:  Recent Labs   Lab Test 04/03/25  0617 04/02/25  0841   AST 39 45   ALT 20 23   ALKPHOS 105 119   BILITOTAL 1.0 1.4*   ,   Results for orders placed or performed during the hospital encounter of 04/01/25   XR Chest 2 Views    Narrative    EXAM: XR CHEST 2 VIEWS  LOCATION: [facility]  DATE: 4/1/2025    INDICATION: pneumonia evaluation  COMPARISON: 9/17/2024      Impression    IMPRESSION: Negative chest.   US Abdomen Limited (RUQ)    Narrative    EXAM: US ABDOMEN LIMITED  LOCATION: [facility]  DATE: 4/2/2025    INDICATION: nausea vomiting transaminitis  COMPARISON: CT abdomen and pelvis 10/3/2023.  TECHNIQUE: Limited abdominal ultrasound.    FINDINGS:    GALLBLADDER: Normal. No gallstones, wall thickening, or pericholecystic fluid. Negative sonographic Alberto's sign.    BILE DUCTS: No biliary dilatation. The common duct measures 2 mm.    LIVER: Normal parenchyma with smooth contour. No focal mass. The portal vein is patent with flow in the normal direction.    RIGHT KIDNEY: No hydronephrosis.    PANCREAS: The visualized portions are normal.    No ascites.      Impression    IMPRESSION:  Normal limited abdominal ultrasound.           Discharge Medications   Current Discharge Medication List        START taking these medications    Details   vancomycin (VANCOCIN) 125 MG capsule Take 1 capsule (125 mg) by mouth 4 times daily for 38 doses.  Qty: 38 capsule, Refills: 0    Associated Diagnoses: C. difficile colitis           CONTINUE these medications which have NOT CHANGED    Details   acetaminophen (TYLENOL) 500 MG tablet Take 1,000 mg by mouth as needed for pain.      bismuth subsalicylate (PEPTO BISMOL) 262 MG/15ML suspension Take 30 mL by mouth every 60 minutes as needed for stomach upset, vomiting, diarrhea, indigestion. Do not take more than four doses (120 mL) in 24 hours.  Qty: 354 mL, Refills: 0      capsaicin (ZOSTRIX) 0.025 %  external cream Apply topically 3 times daily as needed.      cholecalciferol (VITAMIN D3) 1250 mcg (77967 units) capsule Take 1,250 mcg by mouth every 7 days. Take 4 hours after HIV medication to prevent interaction      diclofenac (VOLTAREN) 1 % topical gel Apply 2 g topically 4 times daily as needed for moderate pain. Leg/feet pain      dolutegravir (TIVICAY) 50 MG tablet Take 50 mg by mouth daily.      DULoxetine (CYMBALTA) 30 MG capsule Take 30 mg by mouth daily.      emtricitabine-rilpivirine-tenofovir (ODEFSEY) 200-25-25 MG TABS per tablet Take 1 tablet by mouth daily (with breakfast).      etonogestrel (NEXPLANON) 68 MG IMPL 1 each by Subdermal route See Admin Instructions.      fluticasone (FLONASE) 50 MCG/ACT nasal spray Spray 2 sprays into both nostrils daily as needed for allergies or rhinitis.      folic acid (FOLVITE) 1 MG tablet Take 1 mg by mouth daily.      hydrOXYzine HCl (ATARAX) 25 MG tablet Take 25 mg by mouth every 8 hours as needed for itching or anxiety.      ibuprofen (ADVIL/MOTRIN) 200 MG tablet Take 400 mg by mouth as needed for pain.      Melatonin 10 MG TABS tablet Take 10 mg by mouth nightly as needed for sleep.      ondansetron (ZOFRAN ODT) 4 MG ODT tab Take 1 tablet (4 mg) by mouth every 8 hours as needed for nausea or vomiting.  Qty: 15 tablet, Refills: 0    Comments: May substitute non-ODT formulation per patient preference/insurance coverage.      pregabalin (LYRICA) 75 MG capsule Take 75 mg by mouth 2 times daily.      Prenatal Vit-Fe Fumarate-FA (PRENATAL MULTIVITAMIN  PLUS IRON) 27-1 MG TABS Take 1 tablet by mouth every evening. Take at least 4 hours after HIV medications to prevent interaction      thiamine (B-1) 100 MG tablet Take 100 mg by mouth daily.      traZODone (DESYREL) 50 MG tablet Take 50 mg by mouth at bedtime.      triamcinolone (KENALOG) 0.1 % external cream Apply topically 2 times daily  Qty: 80 g, Refills: 0      zinc sulfate (ZINCATE) 220 (50 Zn) MG capsule Take  220 mg by mouth every evening. Take 4 hours after HIV medications to prevent interactions           Allergies   No Known Allergies

## 2025-04-03 NOTE — PROGRESS NOTES
Patient C. difficile test positive  -Started on oral vancomycin 125 mg p.o. 4 times daily  -Contact precautions

## 2025-04-03 NOTE — PLAN OF CARE
PRIMARY DIAGNOSIS: Lower extremity Weakness  OUTPATIENT/OBSERVATION GOALS TO BE MET BEFORE DISCHARGE:  ADLs back to baseline: No     Activity and level of assistance: Up with standby assistance.     Pain status: Pain free.     Return to near baseline physical activity: No             Discharge Planner Nurse  Safe discharge environment identified: Yes  Barriers to discharge: Yes       Entered by: Antonia Muro RN 04/02/2025 11:08 PM        Please review provider order for any additional goals.   Nurse to notify provider when observation goals have been met and patient is ready for discharge.  Goal Outcome Evaluation:     A&O x4. On RA. SBA. Nacl discontinued, PIV saline locked. Denies pain. PRN trazodone given per pt request.

## 2025-04-03 NOTE — PLAN OF CARE
PRIMARY DIAGNOSIS: Lower extremity Weakness  OUTPATIENT/OBSERVATION GOALS TO BE MET BEFORE DISCHARGE:  ADLs back to baseline: No     Activity and level of assistance: Up with standby assistance.     Pain status: Pain free.     Return to near baseline physical activity: No             Discharge Planner Nurse  Safe discharge environment identified: Yes  Barriers to discharge: Yes       Entered by: Antonia Muro RN 04/02/2025         Please review provider order for any additional goals.   Nurse to notify provider when observation goals have been met and patient is ready for discharge.  Goal Outcome Evaluation:     A&O x4. On RA. SBA. Nacl discontinued, PIV saline locked. Denies pain. PRN trazodone given per pt request. CIWA of 2.

## 2025-04-03 NOTE — PLAN OF CARE
PRIMARY DIAGNOSIS: Lower extremity Weakness  OUTPATIENT/OBSERVATION GOALS TO BE MET BEFORE DISCHARGE:  ADLs back to baseline: No    Activity and level of assistance: Up with standby assistance.    Pain status: Pain free.    Return to near baseline physical activity: No     Discharge Planner Nurse   Safe discharge environment identified: Yes  Barriers to discharge: Yes       Entered by: Antonia Muro RN 04/02/2025      Please review provider order for any additional goals.   Nurse to notify provider when observation goals have been met and patient is ready for discharge.  Goal Outcome Evaluation:    A&O x4. On RA. SBA. Nacl discontinued, PIV saline locked. Denies pain. Ciwa of 2.

## 2025-04-03 NOTE — CARE PLAN
"PRIMARY DIAGNOSIS: N/D/V/ TRANSAMINITIS   OUTPATIENT/OBSERVATION GOALS TO BE MET BEFORE DISCHARGE:  ADLs back to baseline: Yes    Activity and level of assistance: Ambulating independently.    Pain status: Pain free.    Return to near baseline physical activity: Yes     Discharge Planner Nurse   Safe discharge environment identified: Yes  Barriers to discharge: No       Entered by: Vicki Garrett RN 04/03/2025      Please review provider order for any additional goals.   Nurse to notify provider when observation goals have been met and patient is ready for discharge.  /75 (BP Location: Left arm)   Pulse 85   Temp 98.3  F (36.8  C) (Oral)   Resp 16   Ht 1.702 m (5' 7\")   Wt 51.3 kg (113 lb 1.6 oz)   LMP 03/30/2025 (Approximate)   SpO2 100%   BMI 17.71 kg/m      Pt is alert and oriented x4. Able to make needs known. VS documented on FS. Denies any pain/ SOB/ vomiting/ dizziness. Denies having nausea at this time. No episode of diarrhea. Reports mild tremors on BUE, intermittent tingling and numbness on BLE. On RN managed Mg, K, & Phos protocol. Labs WNL. CIWA discontinued. Pt on contact precaution for pos. C. Diff & ESBL. Reg diet. Ind in the room. Bed alarm off per pt's refusal. Education provided. SL.    Plan: Discharge this afternoon.  "

## 2025-04-03 NOTE — PLAN OF CARE
"PRIMARY DIAGNOSIS: N/D/V/ TRANSAMINITIS    OUTPATIENT/OBSERVATION GOALS TO BE MET BEFORE DISCHARGE  1. Orthostatic performed: No    2. Tolerating PO medications: Yes    3. Return to near baseline physical activity: Yes    4. Cleared for discharge by consultants (if involved): No    Discharge Planner Nurse   Safe discharge environment identified: Yes  Barriers to discharge: Yes       Entered by: Vicki Garrett RN 04/03/2025      Please review provider order for any additional goals.   Nurse to notify provider when observation goals have been met and patient is ready for discharge.    /75 (BP Location: Left arm)   Pulse 85   Temp 98.3  F (36.8  C) (Oral)   Resp 16   Ht 1.702 m (5' 7\")   Wt 51.3 kg (113 lb 1.6 oz)   LMP 03/30/2025 (Approximate)   SpO2 100%   BMI 17.71 kg/m      Pt is alert and oriented x4. Able to make needs known. VS documented on FS. Denies any pain/ SOB/ vomiting/ dizziness. Reports intermittent nausea. No episode of diarrhea this morning. Reports mild tremors on BUE, intermittent tingling and numbness on BLE. CIWA score 2. Pt  on contact precaution for pos. C. Diff & ESBL. Reg diet. Ind in the room. Bed alarm off per pt's refusal. Education provided. On RN managed Mg, K, & Phos protocol. SL.      Goal Outcome Evaluation:      Plan of Care Reviewed With: patient    Overall Patient Progress: improvingOverall Patient Progress: improving    Outcome Evaluation: Pt is alerts and oriented x4. Able to make needs known.      "

## 2025-04-07 LAB
BACTERIA BLD CULT: NO GROWTH
BACTERIA BLD CULT: NO GROWTH

## (undated) DEVICE — FILTER BERKLEY SAFETOUCH

## (undated) DEVICE — PACK MINOR LITHOTOMY RIDGES

## (undated) DEVICE — NDL SPINAL 22GA 7" QUINCKE 405149

## (undated) DEVICE — SUCTION CANNULA UTERINE 09MM CVD 022109-10

## (undated) DEVICE — SUCTION CANNULA UTERINE 08MM CVD 022108-10

## (undated) DEVICE — GLOVE BIOGEL PI ULTRATOUCH SZ 7.5 41175

## (undated) DEVICE — SPECIMEN TRAP VACUUM SUCTION 003984-901

## (undated) DEVICE — BAG CLEAR TRASH 1.3M 39X33" P4040C

## (undated) DEVICE — LINEN HALF SHEET 5512

## (undated) DEVICE — SYR 10ML FINGER CONTROL W/O NDL 309695

## (undated) DEVICE — LINEN TOWEL PACK X5 5464

## (undated) DEVICE — PAD CHUX UNDERPAD 30X36" P3036C

## (undated) DEVICE — LINEN FULL SHEET 5511

## (undated) DEVICE — GLOVE BIOGEL PI SZ 7.5 40875

## (undated) DEVICE — SOL NACL 0.9% IRRIG 1000ML BOTTLE 2F7124

## (undated) DEVICE — GLOVE BIOGEL PI MICRO INDICATOR UNDERGLOVE SZ 8.0 48980

## (undated) DEVICE — TUBING SUCTION VACUUM COLLECTION 6FT 610

## (undated) RX ORDER — LIDOCAINE HYDROCHLORIDE 10 MG/ML
INJECTION, SOLUTION EPIDURAL; INFILTRATION; INTRACAUDAL; PERINEURAL
Status: DISPENSED
Start: 2024-06-24

## (undated) RX ORDER — FENTANYL CITRATE 50 UG/ML
INJECTION, SOLUTION INTRAMUSCULAR; INTRAVENOUS
Status: DISPENSED
Start: 2024-06-24

## (undated) RX ORDER — GLYCOPYRROLATE 0.2 MG/ML
INJECTION, SOLUTION INTRAMUSCULAR; INTRAVENOUS
Status: DISPENSED
Start: 2024-06-24

## (undated) RX ORDER — DOXYCYCLINE 100 MG/1
CAPSULE ORAL
Status: DISPENSED
Start: 2024-06-24

## (undated) RX ORDER — ACETAMINOPHEN 325 MG/1
TABLET ORAL
Status: DISPENSED
Start: 2024-06-24

## (undated) RX ORDER — ONDANSETRON 2 MG/ML
INJECTION INTRAMUSCULAR; INTRAVENOUS
Status: DISPENSED
Start: 2024-06-24

## (undated) RX ORDER — DEXAMETHASONE SODIUM PHOSPHATE 4 MG/ML
INJECTION, SOLUTION INTRA-ARTICULAR; INTRALESIONAL; INTRAMUSCULAR; INTRAVENOUS; SOFT TISSUE
Status: DISPENSED
Start: 2024-06-24

## (undated) RX ORDER — PROPOFOL 10 MG/ML
INJECTION, EMULSION INTRAVENOUS
Status: DISPENSED
Start: 2024-06-24